# Patient Record
Sex: FEMALE | Race: WHITE | Employment: OTHER | URBAN - METROPOLITAN AREA
[De-identification: names, ages, dates, MRNs, and addresses within clinical notes are randomized per-mention and may not be internally consistent; named-entity substitution may affect disease eponyms.]

---

## 2020-08-18 ENCOUNTER — HOSPITAL ENCOUNTER (OUTPATIENT)
Dept: PHYSICAL THERAPY | Age: 65
Discharge: HOME | End: 2020-08-18
Payer: COMMERCIAL

## 2020-08-18 ENCOUNTER — HOSPITAL ENCOUNTER (OUTPATIENT)
Dept: SURGERY | Age: 65
Discharge: HOME | End: 2020-08-18
Attending: ORTHOPAEDIC SURGERY
Payer: COMMERCIAL

## 2020-08-18 ENCOUNTER — HOME HEALTH ADMISSION (OUTPATIENT)
Dept: HOME HEALTH SERVICES | Facility: HOME HEALTH | Age: 65
End: 2020-08-18
Payer: COMMERCIAL

## 2020-08-18 VITALS
HEIGHT: 71 IN | SYSTOLIC BLOOD PRESSURE: 141 MMHG | HEART RATE: 73 BPM | TEMPERATURE: 98.2 F | WEIGHT: 278 LBS | OXYGEN SATURATION: 96 % | BODY MASS INDEX: 38.92 KG/M2 | RESPIRATION RATE: 20 BRPM | DIASTOLIC BLOOD PRESSURE: 69 MMHG

## 2020-08-18 DIAGNOSIS — G47.30 SLEEP APNEA, UNSPECIFIED TYPE: Primary | ICD-10-CM

## 2020-08-18 LAB
ANION GAP SERPL CALC-SCNC: 8 MMOL/L (ref 7–16)
APTT PPP: 26.9 SEC (ref 24.3–35.4)
ATRIAL RATE: 70 BPM
BACTERIA SPEC CULT: ABNORMAL
BASOPHILS # BLD: 0.1 K/UL (ref 0–0.2)
BASOPHILS NFR BLD: 1 % (ref 0–2)
BUN SERPL-MCNC: 18 MG/DL (ref 8–23)
CALCIUM SERPL-MCNC: 8.7 MG/DL (ref 8.3–10.4)
CALCULATED P AXIS, ECG09: 18 DEGREES
CALCULATED R AXIS, ECG10: -4 DEGREES
CALCULATED T AXIS, ECG11: 13 DEGREES
CHLORIDE SERPL-SCNC: 109 MMOL/L (ref 98–107)
CO2 SERPL-SCNC: 24 MMOL/L (ref 21–32)
CREAT SERPL-MCNC: 0.7 MG/DL (ref 0.6–1)
DIAGNOSIS, 93000: NORMAL
DIFFERENTIAL METHOD BLD: NORMAL
EOSINOPHIL # BLD: 0.2 K/UL (ref 0–0.8)
EOSINOPHIL NFR BLD: 4 % (ref 0.5–7.8)
ERYTHROCYTE [DISTWIDTH] IN BLOOD BY AUTOMATED COUNT: 12.5 % (ref 11.9–14.6)
EST. AVERAGE GLUCOSE BLD GHB EST-MCNC: 166 MG/DL
GLUCOSE SERPL-MCNC: 206 MG/DL (ref 65–100)
HBA1C MFR BLD: 7.4 %
HCT VFR BLD AUTO: 39.1 % (ref 35.8–46.3)
HGB BLD-MCNC: 13.1 G/DL (ref 11.7–15.4)
IMM GRANULOCYTES # BLD AUTO: 0 K/UL (ref 0–0.5)
IMM GRANULOCYTES NFR BLD AUTO: 0 % (ref 0–5)
INR PPP: 1
LYMPHOCYTES # BLD: 2.2 K/UL (ref 0.5–4.6)
LYMPHOCYTES NFR BLD: 34 % (ref 13–44)
MCH RBC QN AUTO: 29.4 PG (ref 26.1–32.9)
MCHC RBC AUTO-ENTMCNC: 33.5 G/DL (ref 31.4–35)
MCV RBC AUTO: 87.9 FL (ref 79.6–97.8)
MONOCYTES # BLD: 0.3 K/UL (ref 0.1–1.3)
MONOCYTES NFR BLD: 5 % (ref 4–12)
NEUTS SEG # BLD: 3.6 K/UL (ref 1.7–8.2)
NEUTS SEG NFR BLD: 56 % (ref 43–78)
NRBC # BLD: 0 K/UL (ref 0–0.2)
P-R INTERVAL, ECG05: 174 MS
PLATELET # BLD AUTO: 226 K/UL (ref 150–450)
PMV BLD AUTO: 10 FL (ref 9.4–12.3)
POTASSIUM SERPL-SCNC: 4.1 MMOL/L (ref 3.5–5.1)
PROTHROMBIN TIME: 13.6 SEC (ref 12–14.7)
Q-T INTERVAL, ECG07: 402 MS
QRS DURATION, ECG06: 74 MS
QTC CALCULATION (BEZET), ECG08: 434 MS
RBC # BLD AUTO: 4.45 M/UL (ref 4.05–5.2)
SERVICE CMNT-IMP: ABNORMAL
SODIUM SERPL-SCNC: 141 MMOL/L (ref 136–145)
VENTRICULAR RATE, ECG03: 70 BPM
WBC # BLD AUTO: 6.4 K/UL (ref 4.3–11.1)

## 2020-08-18 PROCEDURE — 77030027138 HC INCENT SPIROMETER -A

## 2020-08-18 PROCEDURE — 97161 PT EVAL LOW COMPLEX 20 MIN: CPT

## 2020-08-18 PROCEDURE — 36415 COLL VENOUS BLD VENIPUNCTURE: CPT

## 2020-08-18 PROCEDURE — 85610 PROTHROMBIN TIME: CPT

## 2020-08-18 PROCEDURE — 83036 HEMOGLOBIN GLYCOSYLATED A1C: CPT

## 2020-08-18 PROCEDURE — 85025 COMPLETE CBC W/AUTO DIFF WBC: CPT

## 2020-08-18 PROCEDURE — 87641 MR-STAPH DNA AMP PROBE: CPT

## 2020-08-18 PROCEDURE — 80048 BASIC METABOLIC PNL TOTAL CA: CPT

## 2020-08-18 PROCEDURE — 85730 THROMBOPLASTIN TIME PARTIAL: CPT

## 2020-08-18 RX ORDER — IBUPROFEN 200 MG
TABLET ORAL
COMMUNITY
End: 2020-09-03

## 2020-08-18 RX ORDER — METFORMIN HYDROCHLORIDE 500 MG/1
1000 TABLET ORAL DAILY
COMMUNITY

## 2020-08-18 RX ORDER — LISINOPRIL 10 MG/1
10 TABLET ORAL DAILY
COMMUNITY

## 2020-08-18 NOTE — PERIOP NOTES
PLEASE CONTINUE TAKING ALL PRESCRIPTION MEDICATIONS UP TO THE DAY OF SURGERY UNLESS OTHERWISE DIRECTED BELOW. DISCONTINUE all vitamins, herbals and supplements 21 days prior to surgery. DISCONTINUE Non-Steriodal Anti-Inflammatory (NSAIDS) such as Advil, Ibuprofen, and Aleve 5 days prior to surgery. Home Medications to HOLD      All vitamins, supplements, and herbals stop NOW. All NSAIDs such as Advil, Aleve, Ibuprofen, Diclofenac, Naproxen, etc. Stop 5 days prior to surgery. Home Medications to take  the day of surgery   29 units of insulin (levemir)         Comments   Bring: Levemir, bottle of soap (Dynahex), CPAP, and incentive spirometer to the hospital.              Please do not bring home medications with you on the day of surgery unless otherwise directed by your nurse. If you are instructed to bring home medications, please give them to your nurse as they will be administered by the nursing staff. If you have any questions, please call [facility] [phone] or [facility] [phone]. Copy of above instructions given to patient.

## 2020-08-18 NOTE — PERIOP NOTES
Labs dated 8/18/20 unable to be routed via [facility] to patients PCP, Dr. Casey Castro; no fax number available and no answer when this RN attempted to contact office x2.

## 2020-08-18 NOTE — ADVANCED PRACTICE NURSE
Total Joint Surgery Preoperative Chart Review      Patient ID:  Marilee Villarreal  [id number]  63 y.o.  1955  Surgeon: Dr. Isabel Castillo  Date of Surgery: 9/1/2020  Procedure: Total Left Knee Arthroplasty  Primary Care Physician: Fior Adams MD None  Specialty Physician(s):      Subjective:   Marilee Villarreal is a 63 y.o. WHITE OR  female who presents for preoperative evaluation for Total Left Knee arthroplasty. This is a preoperative chart review note based on data collected by the nurse at the surgical Pre-Assessment visit. Past Medical History:   Diagnosis Date    Hypertension     SAWYER (obstructive sleep apnea)     CPAP [facility]    Osteoarthritis     Type 2 diabetes mellitus ([facility] [address]     controlled with oral medication and insulin, does not check BS daily, no problems with hypoglycemia, Hemoglobin A1c 7.4 on 8/18/20. Past Surgical History:   Procedure Laterality Date    HX LAP CHOLECYSTECTOMY      HX TONSILLECTOMY       History reviewed. No pertinent family history. Social History     Tobacco Use    Smoking status: Never Smoker    Smokeless tobacco: Never Used   Substance Use Topics    Alcohol use: Never     Frequency: Never       Prior to Admission medications    Medication Sig Start Date End Date Taking? Authorizing Provider   metFORMIN (GLUCOPHAGE) 500 mg tablet Take 1,000 mg by mouth daily. Yes Provider, Historical   lisinopriL (PRINIVIL, ZESTRIL) 10 mg tablet Take 10 mg by mouth daily. Yes Provider, Historical   insulin detemir U-100 (LEVEMIR) 100 unit/mL injection 36 Units by SubCUTAneous route daily. Patient instructed to take 29 units of insulin the morning of surgery, which is 80% of normal dose, per anesthesia protocol. Yes Provider, Historical   cholecalciferol, vitamin D3, (VITAMIN D3 PO) Take  by mouth daily. Yes Provider, Historical   ibuprofen (MOTRIN) 200 mg tablet Take  by mouth.    Yes Provider, Historical     Allergies   Allergen Reactions    Ampicillin Diarrhea Objective:     Physical Exam:   Patient Vitals for the past 24 hrs:   Temp Pulse Resp BP SpO2   08/18/20 1203 98.2 °F (36.8 °C) 73 20 141/69 96 %       ECG:    EKG Results     Procedure 720 Value Units Date/Time    EKG, 12 LEAD, INITIAL [[id number]] Collected:  08/18/20 1109    Order Status:  Completed Updated:  08/18/20 1508     Ventricular Rate 70 BPM      Atrial Rate 70 BPM      P-R Interval 174 ms      QRS Duration 74 ms      Q-T Interval 402 ms      QTC Calculation (Bezet) 434 ms      Calculated P Axis 18 degrees      Calculated R Axis -4 degrees      Calculated T Axis 13 degrees      Diagnosis --     Normal sinus rhythm  Low voltage QRS  Poor R wave progression  Abnormal ECG  When compared with ECG of 30-SEP-2005 14:00,  No significant change was found  Confirmed by Brab Harman MD (), LUDY ([id number]) on 8/18/2020 3:08:10 PM            Data Review:   Labs:   Recent Results (from the past 24 hour(s))   CBC WITH AUTOMATED DIFF    Collection Time: 08/18/20 10:58 AM   Result Value Ref Range    WBC 6.4 4.3 - 11.1 K/uL    RBC 4.45 4.05 - 5.2 M/uL    HGB 13.1 11.7 - 15.4 g/dL    HCT 39.1 35.8 - 46.3 %    MCV 87.9 79.6 - 97.8 FL    MCH 29.4 26.1 - 32.9 PG    MCHC 33.5 31.4 - 35.0 g/dL    RDW 12.5 11.9 - 14.6 %    PLATELET 957 451 - 869 K/uL    MPV 10.0 9.4 - 12.3 FL    ABSOLUTE NRBC 0.00 0.0 - 0.2 K/uL    DF AUTOMATED      NEUTROPHILS 56 43 - 78 %    LYMPHOCYTES 34 13 - 44 %    MONOCYTES 5 4.0 - 12.0 %    EOSINOPHILS 4 0.5 - 7.8 %    BASOPHILS 1 0.0 - 2.0 %    IMMATURE GRANULOCYTES 0 0.0 - 5.0 %    ABS. NEUTROPHILS 3.6 1.7 - 8.2 K/UL    ABS. LYMPHOCYTES 2.2 0.5 - 4.6 K/UL    ABS. MONOCYTES 0.3 0.1 - 1.3 K/UL    ABS. EOSINOPHILS 0.2 0.0 - 0.8 K/UL    ABS. BASOPHILS 0.1 0.0 - 0.2 K/UL    ABS. IMM.  GRANS. 0.0 0.0 - 0.5 K/UL   HEMOGLOBIN A1C WITH EAG    Collection Time: 08/18/20 10:58 AM   Result Value Ref Range    Hemoglobin A1c 7.4 %    Est. average glucose 509 mg/dL   METABOLIC PANEL, BASIC    Collection Time: 08/18/20 10:58 AM   Result Value Ref Range    Sodium 141 136 - 145 mmol/L    Potassium 4.1 3.5 - 5.1 mmol/L    Chloride 109 (H) 98 - 107 mmol/L    CO2 24 21 - 32 mmol/L    Anion gap 8 7 - 16 mmol/L    Glucose 206 (H) 65 - 100 mg/dL    BUN 18 8 - 23 MG/DL    Creatinine 0.70 0.6 - 1.0 MG/DL    GFR est AA >60 >60 ml/min/1.73m2    GFR est non-AA >60 >60 ml/min/1.73m2    Calcium 8.7 8.3 - 10.4 MG/DL   PROTHROMBIN TIME + INR    Collection Time: 08/18/20 10:58 AM   Result Value Ref Range    Prothrombin time 13.6 12.0 - 14.7 sec    INR 1.0     PTT    Collection Time: 08/18/20 10:58 AM   Result Value Ref Range    aPTT 26.9 24.3 - 35.4 SEC   EKG, 12 LEAD, INITIAL    Collection Time: 08/18/20 11:09 AM   Result Value Ref Range    Ventricular Rate 70 BPM    Atrial Rate 70 BPM    P-R Interval 174 ms    QRS Duration 74 ms    Q-T Interval 402 ms    QTC Calculation (Bezet) 434 ms    Calculated P Axis 18 degrees    Calculated R Axis -4 degrees    Calculated T Axis 13 degrees    Diagnosis       Normal sinus rhythm  Low voltage QRS  Poor R wave progression  Abnormal ECG  When compared with ECG of 30-SEP-2005 14:00,  No significant change was found  Confirmed by Lubna Dill MD (), LUDY ([id number]) on 8/18/2020 3:08:10 PM     MSSA/MRSA SC BY PCR, NASAL SWAB    Collection Time: 08/18/20 11:40 AM    Specimen: Nasal swab   Result Value Ref Range    Special Requests: NASAL O2      Culture result: (A)       MRSA target DNA not detected, SA target DNA detected. A MRSA negative, SA positive test result does not preclude MRSA nasal colonization. Problem List:  )There is no problem list on file for this patient. Total Joint Surgery Pre-Assessment Recommendations:           Patient is to wear home CPAP during hospitalization.    Will order HST patient needs new CPAP  Signed By: JASON Beavers    August 18, 2020

## 2020-08-18 NOTE — PROGRESS NOTES
Alexandrea Ward  : (67 y.o.) Joint Catrina Finger at [address] [address].  [phone]       Physical Therapy Prehab Plan of Treatment and Evaluation Summary:2020    ICD-10: Treatment Diagnosis:   · Pain in Left Knee (M25.562)  · Stiffness of Left Knee, Not elsewhere classified (B78.322)  Precautions/Allergies:   Patient has no allergy information on record. MEDICAL/REFERRING DIAGNOSIS:  Bilateral primary osteoarthritis of knee [M17.0]  REFERRING PHYSICIAN: Brian Lanza,*  DATE OF SURGERY: 20    Assessment:   Comments:  She is here by herself. She plans on having L TKA and will go home at [place] with her spouse and he will offer assistance. PROBLEM LIST (Impacting functional limitations):  Ms. Riva Favre presents with the following left lower extremity(s) problems:  1. Strength  2. Range of Motion  3. Home Exercise Program  4. Pain   INTERVENTIONS PLANNED:  1. Home Exercise Program  2. Educational Discussion      TREATMENT PLAN: Effective Dates: 2020 TO 2020. Frequency/Duration: Patient to continue to perform home exercise program at least twice per day up until her surgery. GOALS: (Goals have been discussed and agreed upon with patient.)  Discharge Goals: Time Frame: 1 Day  1. Patient will demonstrate independence with a home exercise program designed to increase strength, range of motion and pain control to minimize functional deficits and optimize patient for total joint replacement. Rehabilitation Potential For Stated Goals: Good  Regarding Garrett Paris's therapy, I certify that the treatment plan above will be carried out by a therapist or under their direction.   Thank you for this referral,  Jennifer Low, PT               HISTORY:   Present Symptoms:  Pain Intensity 1: 7  Pain Location 1: Knee  Pain Orientation 1: Anterior, Lateral, Medial, Left   History of Present Injury/Illness (Reason for Referral):  Medical/Referring Diagnosis: Bilateral primary osteoarthritis of knee [M17.0]   Past Medical History/Comorbidities:   Ms. French Crowell  has a past medical history of Hypertension, SAWYER (obstructive sleep apnea), and Type 2 diabetes mellitus ([facility] [address] Ms. French Crowell  has a past surgical history that includes hx tonsillectomy.   Social History/Living Environment:   Home Environment: Private residence  # Steps to Enter: 5  Hand Rails : Right  One/Two Story Residence: Two story, live on 1st floor  # of Interior Steps: [address]: Left  Support Systems: Parent  Patient Expects to be Discharged to[de-identified] Private residence  Current DME Used/Available at Home: None  Work/Activity:  retired  Dominant Side:  RIGHT  Current Medications:  See Pre-assessment nursing note   Number of Personal Factors/Comorbidities that affect the Plan of Care: 1-2: MODERATE COMPLEXITY   EXAMINATION:   ADLs (Current Functional Status):   Ambulation:  [x] Independent  [] Walk Indoors Only  [] Walk Outdoors  [] Use Assistive Device  [] Use Wheelchair Only Dressing:  [x] [address] from Someone for:  [] Sock/Shoes  [] Pants  [] Everything   Bathing/Showering:   [x] Independent  [] Requires Assistance from Someone  [] [address] Only Household Activities:  [x] Routine house and yard work  [] Light Housework Only  [] None   Observation/Orthostatic Postural Assessment:    Genu varus left, Genu varus right  ROM/Flexibility:   AROM: Within functional limits(R LE)                           Strength:   Strength: Generally decreased, functional(R LE 4/5)                  Functional Mobility:    Sensation: Intact(R LE)    Stand to Sit: Independent  Sit to Stand: Independent  Stand Pivot Transfers: Independent  Distance (ft): 300 Feet (ft)  Ambulation - Level of Assistance: Independent  Speed/Lizzie: Pace decreased (<100 feet/min)  Gait Abnormalities: Antalgic          Balance:    Sitting: Intact  Standing: Intact   Body Structures Involved:  1. Bones  2. Joints  3. Muscles Body Functions Affected:  1. Neuromusculoskeletal  2. Movement Related Activities and Participation Affected:  1. General Tasks and Demands  2. Mobility   Number of elements that affect the Plan of Care: 4+: HIGH COMPLEXITY   CLINICAL PRESENTATION:   Presentation: Stable and uncomplicated: LOW COMPLEXITY   CLINICAL DECISION MAKING:   Outcome Measure: Tool Used: Lower Extremity Functional Scale (LEFS)  Score:  Initial: 45/80 Most Recent: X/80 (Date: -- )   Interpretation of Score: 20 questions each scored on a 5 point scale with 0 representing \"extreme difficulty or unable to perform\" and 4 representing \"no difficulty\". The lower the score, the greater the functional disability. 80/80 represents no disability. Minimal detectable change is 9 points. Medical Necessity:   · Ms. Vane Gloria is expected to optimize her lower extremity strength and ROM in preparation for joint replacement surgery. Reason for Services/Other Comments:  · Achieve baseline assesment of musculoskeletal system, functional mobility and home environment. , educate in PT HEP in preparation for surgery, educate in hospital plan of care. Use of outcome tool(s) and clinical judgement create a POC that gives a: Clear prediction of patient's progress: LOW COMPLEXITY   TREATMENT:   Treatment/Session Assessment:  Patient was instructed in PT- HEP to increase strength and ROM in LEs. Answered all questions. · Post session pain:  2  · Compliance with Program/Exercises: compliant most of the time.   Total Treatment Duration:  PT Patient Time In/Time Out  Time In: 1045  Time Out: [address] [facility]

## 2020-08-18 NOTE — PROGRESS NOTES
SW spoke with pt in prehab. Pt is scheduled for a L TKA on 9-1 by Dr. Venice Feliz. Pt plans to d/c home with family support ( going to her mom's [address] [place] [address] ) & HH. Pt offered choices of Hh & prefers [facility]. Pt will need DME ( [facility] and [facility] ). Referral made to [facility].

## 2020-08-18 NOTE — PERIOP NOTES
Recent Results (from the past 12 hour(s))   CBC WITH AUTOMATED DIFF    Collection Time: 08/18/20 10:58 AM   Result Value Ref Range    WBC 6.4 4.3 - 11.1 K/uL    RBC 4.45 4.05 - 5.2 M/uL    HGB 13.1 11.7 - 15.4 g/dL    HCT 39.1 35.8 - 46.3 %    MCV 87.9 79.6 - 97.8 FL    MCH 29.4 26.1 - 32.9 PG    MCHC 33.5 31.4 - 35.0 g/dL    RDW 12.5 11.9 - 14.6 %    PLATELET 145 131 - 075 K/uL    MPV 10.0 9.4 - 12.3 FL    ABSOLUTE NRBC 0.00 0.0 - 0.2 K/uL    DF AUTOMATED      NEUTROPHILS 56 43 - 78 %    LYMPHOCYTES 34 13 - 44 %    MONOCYTES 5 4.0 - 12.0 %    EOSINOPHILS 4 0.5 - 7.8 %    BASOPHILS 1 0.0 - 2.0 %    IMMATURE GRANULOCYTES 0 0.0 - 5.0 %    ABS. NEUTROPHILS 3.6 1.7 - 8.2 K/UL    ABS. LYMPHOCYTES 2.2 0.5 - 4.6 K/UL    ABS. MONOCYTES 0.3 0.1 - 1.3 K/UL    ABS. EOSINOPHILS 0.2 0.0 - 0.8 K/UL    ABS. BASOPHILS 0.1 0.0 - 0.2 K/UL    ABS. IMM.  GRANS. 0.0 0.0 - 0.5 K/UL   HEMOGLOBIN A1C WITH EAG    Collection Time: 08/18/20 10:58 AM   Result Value Ref Range    Hemoglobin A1c 7.4 %    Est. average glucose 448 mg/dL   METABOLIC PANEL, BASIC    Collection Time: 08/18/20 10:58 AM   Result Value Ref Range    Sodium 141 136 - 145 mmol/L    Potassium 4.1 3.5 - 5.1 mmol/L    Chloride 109 (H) 98 - 107 mmol/L    CO2 24 21 - 32 mmol/L    Anion gap 8 7 - 16 mmol/L    Glucose 206 (H) 65 - 100 mg/dL    BUN 18 8 - 23 MG/DL    Creatinine 0.70 0.6 - 1.0 MG/DL    GFR est AA >60 >60 ml/min/1.73m2    GFR est non-AA >60 >60 ml/min/1.73m2    Calcium 8.7 8.3 - 10.4 MG/DL   PROTHROMBIN TIME + INR    Collection Time: 08/18/20 10:58 AM   Result Value Ref Range    Prothrombin time 13.6 12.0 - 14.7 sec    INR 1.0     PTT    Collection Time: 08/18/20 10:58 AM   Result Value Ref Range    aPTT 26.9 24.3 - 35.4 SEC   EKG, 12 LEAD, INITIAL    Collection Time: 08/18/20 11:09 AM   Result Value Ref Range    Ventricular Rate 70 BPM    Atrial Rate 70 BPM    P-R Interval 174 ms    QRS Duration 74 ms    Q-T Interval 402 ms    QTC Calculation (Bezet) 434 ms Calculated P Axis 18 degrees    Calculated R Axis -4 degrees    Calculated T Axis 13 degrees    Diagnosis       Normal sinus rhythm  Low voltage QRS  Cannot rule out Anterior infarct (cited on or before 30-SEP-2005)  Abnormal ECG  When compared with ECG of 30-SEP-2005 14:00,  No significant change was found

## 2020-08-18 NOTE — PROGRESS NOTES
08/18/20 1030   Oxygen Therapy   O2 Sat (%) 95 %   Pulse via Oximetry 91 beats per minute   O2 Device Room air   Pre-Treatment   Breath Sounds Bilateral Diminished   Incentive Spirometry Treatment   Actual Volume (ml) 1500 ml   Pt's symptoms include:    Snoring  Tiredness- excessive daytime sleepiness  Observed apnea  HTN  Neck size      44.5        cm  Modified Conway stage 4  SACS Score 42  STOP BANG 7    Height  5  11  '    \"   Weight  278   lbs  BMI 38.77  Initial respiratory Assessment completed with pt. Pt was interviewed and evaluated in Joint camp prior to surgery. Patient ID:  Chela Wilson  [id number]  63 y.o.  1955  Surgeon: Dr. Shraddha Rodriguez  Date of Surgery: 9/1/2020  Procedure: Total Left Knee Arthroplasty  Primary Care Physician: Vicky Woodward MD None  Specialists:     Pt. IS SOMEWHAT PRACTICING SOCIAL DISTANCING AND WEARING A MASK WHEN IN PUBLIC. Pt instructed in the use of Incentive Spirometry. Pt instructed to bring Incentive Spirometer back on date of surgery & to start using Is upon return to pt room. Written instructions given to patient. Pt taught proper cough technique    History of smoking:   DENIES                 Quit date:         Secondhand smoke:DENIES    Past procedures with Oxygen desaturation or delayed awakening:DENIES    Past Medical History:   Diagnosis Date    Hypertension     SAWYER (obstructive sleep apnea)     CPAP QHS    Osteoarthritis     Type 2 diabetes mellitus ([facility] [address]     controlled with oral medication and insulin, does not check BS daily, no problems with hypoglycemia, Hemoglobin A1c 7.4 on 8/18/20.           Respiratory history:DENIES SOB                                                                  Respiratory meds:  DENIES    FAMILY PRESENT:             NO                                                   PAST SLEEP STUDY:        YES                  IN [facility] OF SAWYER:                        YES                    SAWYER assessment: SLEEPS ON SIDE                                                  PHYSICAL EXAM   Body mass index is 38.77 kg/m². Visit Vitals  /69 (BP 1 Location: Left arm, BP Patient Position: At rest;Sitting)   Pulse 73   Temp 98.2 °F (36.8 °C)   Resp 20   Ht 5' 11\" (1.803 m)   Wt 126.1 kg (278 lb)   SpO2 96%   BMI 38.77 kg/m²     Neck circumference:   44.5   cm    Loud snoring:                                                 YES            Witnessed apnea or wakening gasping or choking:               APNEA  Awakens with headaches:                                               YES  Morning or daytime tiredness/ sleepiness:                           TIRED  Dry mouth or sore throat in morning:            YES                                               Conway stage:  4                                   SACS score:42  Stop Bang   STOP-BANG  Does the patient snore loudly (louder than talking or loud enough to be heard through closed doors)?: Yes  Does the patient often feel tired, fatigued, or sleepy during the daytime, even after a \"good\" night's sleep?: Yes  Has anyone ever observed the patient stop breathing during their sleep? : Yes  Does the patient have or are they being treated for high blood pressure?: Yes  Is the patient's BMI greater than 35?: Yes  Is your neck circumference greater than 17 inches (Male) or 16 inches (Female)?: Yes  Is the patient older than 48?: Yes  Is the patient male?: No  SAWYER Score: 7  Has the patient been referred to Sleep Medicine?: Yes  Has the patient previously been diagnosed with Obstructive Sleep Apnea?: Yes  Treated or Untreated?: Treated                            Pt. Is positive for SAWYER and uses HOME CPAP and will bring to Hospital day of surgery. PT HAS NOT HAD ANEW CPAP IN YEARS AND WANTED REFERRAL TO SLEEP CENTER                                           Referrals:  [facility]  Pt.  Phone Number:  [phone]    Refer patient for sleep study based on above assessment. Initial respiratory Assessment completed with pt. Pt was interviewed and evaluated in Joint camp prior to surgery. Patient ID:  Alexandrea Ward  [id number]  63 y.o.  1955  Surgeon: Dr. Ihsan Galaviz  Date of Surgery: 9/1/2020  Procedure: Total Left Knee Arthroplasty  Primary Care Physician: Dania Pepper MD None  Specialists:     Pt. IS SOMEWHAT PRACTICING SOCIAL DISTANCING AND WEARING A MASK WHEN IN PUBLIC. Pt instructed in the use of Incentive Spirometry. Pt instructed to bring Incentive Spirometer back on date of surgery & to start using Is upon return to pt room. Written instructions given to patient. Pt taught proper cough technique    History of smoking:   DENIES                 Quit date:         Secondhand smoke:DENIES    Past procedures with Oxygen desaturation or delayed awakening:DENIES    Past Medical History:   Diagnosis Date    Hypertension     SAWYER (obstructive sleep apnea)     CPAP QHS    Osteoarthritis     Type 2 diabetes mellitus ([facility] [address]     controlled with oral medication and insulin, does not check BS daily, no problems with hypoglycemia, Hemoglobin A1c 7.4 on 8/18/20. Respiratory history:DENIES SOB                                                                   Respiratory meds:  DENIES    FAMILY PRESENT:             NO                                                   PAST SLEEP STUDY:        YES                     HX OF SAWYER:                        YES                    SAWYER assessment:                                               SLEEPS ON SIDE                                                   PHYSICAL EXAM   Body mass index is 38.77 kg/m².    Visit Vitals  /69 (BP 1 Location: Left arm, BP Patient Position: At rest;Sitting)   Pulse 73   Temp 98.2 °F (36.8 °C)   Resp 20   Ht 5' 11\" (1.803 m)   Wt 126.1 kg (278 lb)   SpO2 96%   BMI 38.77 kg/m²     Neck circumference: 44.5     cm    Loud snoring: YES             Witnessed apnea or wakening gasping or choking:            APNEA  Awakens with headaches:                                               YES  Morning or daytime tiredness/ sleepiness:                             TIRED  Dry mouth or sore throat in morning:            YES                                              Conway stage:  4                                   SACS score:42  Stop Bang   STOP-BANG  Does the patient snore loudly (louder than talking or loud enough to be heard through closed doors)?: Yes  Does the patient often feel tired, fatigued, or sleepy during the daytime, even after a \"good\" night's sleep?: Yes  Has anyone ever observed the patient stop breathing during their sleep? : Yes  Does the patient have or are they being treated for high blood pressure?: Yes  Is the patient's BMI greater than 35?: Yes  Is your neck circumference greater than 17 inches (Male) or 16 inches (Female)?: Yes  Is the patient older than 48?: Yes  Is the patient male?: No  SAWYER Score: 7  Has the patient been referred to Sleep Medicine?: Yes  Has the patient previously been diagnosed with Obstructive Sleep Apnea?: Yes  Treated or Untreated?: Treated                                                                  Pt. Is positive for SAWYER and uses HOME CPAP and will bring to Hospital day of surgery. PT NEEDS HAST TO ESTABLISH RELATIONSHIP WITH SLEEP CENTER & OBTAIN NEW CPAP    Referrals:  [facility]  Pt.  Phone Number:  [phone]

## 2020-08-18 NOTE — PERIOP NOTES
Patient verified name and . Order for consent found in EHR and matches case posting; patient verified. Type 3 surgery, PAT JOINT assessment complete. Labs per surgeon: CBC,BMP, PT/PTT, Hemoglobin A1c; results within anesthesia limits. T&S DOS and POC glucose DOS; orders signed and held in EHR. Labs per anesthesia protocol: no additional  EKG: Completed today; results to be reviewed by anesthesia-charge nurse to f/u. No previous EKG available. A negative Covid swab result is required to proceed with surgery; The testing center is located at the [address], [place]. An appointment is required therefore the patient will be contacted by the Covid swab team. The testing clinic is closed from  for lunch and on weekends. For questions or concerns the patient should call [phone]. Appointment date/time 20 at 56 found in EHR and provided to patient. MRSA/MSSA swab collected; pharmacy to review and dose antibiotic as appropriate. Hospital approved surgical skin cleanser and instructions to return bottle on DOS given per hospital policy. Patient provided with handouts including Guide to Surgery, Pain Management, Hand Hygiene, Blood Transfusion Education, and [facility] Brochure. Patient answered medical/surgical history questions at their best of ability. All prior to admission medications documented in [facility]. Original medication prescription bottle NOT visualized during patient appointment. Patient instructed to hold all vitamins, supplements, herbals 3 weeks prior to surgery and NSAIDS 5 days prior to surgery. Patient teach back successful and patient demonstrates knowledge of instruction.

## 2020-08-28 NOTE — H&P
[id number] [facility]  History and Physical Exam    Patient ID:  Eunice Keller  [id number]    63 y.o.  1955    Today: August 28, 2020    Vitals Signs: Reviewed as noted in medical record. Allergies: Allergies   Allergen Reactions    Ampicillin Diarrhea       CC: Left knee pain    HPI:  Pt complains of left knee pain and difficulty ambulating. Relevant PMH:   Past Medical History:   Diagnosis Date    Hypertension     SAYWER (obstructive sleep apnea)     CPAP QHS    Osteoarthritis     Type 2 diabetes mellitus ([address]     controlled with oral medication and insulin, does not check BS daily, no problems with hypoglycemia, Hemoglobin A1c 7.4 on 8/18/20. Objective:                    HEENT: NC/AT                   Lungs:  clear                   Heart:   rrr                   Abdomen: soft                   Extremities:  Pain with rom of the left knee joint    Radiographs: reveal osteoarthritis with loss of joint space and bone spurs. Assessment: Bilateral primary osteoarthritis of knee [M17.0]    Plan:  Proceed with scheduled Procedure(s) (LRB):  LEFT KNEE ARTHROPLASTY TOTAL ROBOTIC ASSISTED/ FERNANDA/ DAO (Left) . The patient has failed conservative treatment including NSAIDS, and injections. Due to the amount of pain the patient is experiencing we will proceed with scheduled procedure. It is also felt that the patient is high risk for postoperative complication due to history of multiple chronic medical problems.   The patient may potentially spend 2 nights in the hospital.      Signed By: ANA Hansen  August 28, 2020

## 2020-08-31 RX ORDER — CEFAZOLIN SODIUM/WATER 2 G/20 ML
2 SYRINGE (ML) INTRAVENOUS ONCE
Status: CANCELLED | OUTPATIENT
Start: 2020-09-01 | End: 2020-09-01

## 2020-09-01 ENCOUNTER — ANESTHESIA (OUTPATIENT)
Dept: SURGERY | Age: 65
DRG: 470 | End: 2020-09-01
Payer: COMMERCIAL

## 2020-09-01 ENCOUNTER — ANESTHESIA EVENT (OUTPATIENT)
Dept: SURGERY | Age: 65
DRG: 470 | End: 2020-09-01
Payer: COMMERCIAL

## 2020-09-01 ENCOUNTER — HOSPITAL ENCOUNTER (INPATIENT)
Age: 65
LOS: 2 days | Discharge: HOME HEALTH | DRG: 470 | End: 2020-09-03
Attending: ORTHOPAEDIC SURGERY | Admitting: ORTHOPAEDIC SURGERY
Payer: COMMERCIAL

## 2020-09-01 DIAGNOSIS — Z96.652 STATUS POST TOTAL LEFT KNEE REPLACEMENT: Primary | ICD-10-CM

## 2020-09-01 PROBLEM — M17.12 ARTHRITIS OF LEFT KNEE: Status: ACTIVE | Noted: 2020-09-01

## 2020-09-01 LAB
GLUCOSE BLD STRIP.AUTO-MCNC: 140 MG/DL (ref 65–100)
GLUCOSE BLD STRIP.AUTO-MCNC: 149 MG/DL (ref 65–100)
HGB BLD-MCNC: 11.2 G/DL (ref 11.7–15.4)

## 2020-09-01 PROCEDURE — 97165 OT EVAL LOW COMPLEX 30 MIN: CPT

## 2020-09-01 PROCEDURE — C1713 ANCHOR/SCREW BN/BN,TIS/BN: HCPCS | Performed by: ORTHOPAEDIC SURGERY

## 2020-09-01 PROCEDURE — 74011250637 HC RX REV CODE- 250/637: Performed by: ANESTHESIOLOGY

## 2020-09-01 PROCEDURE — 74011250636 HC RX REV CODE- 250/636: Performed by: ORTHOPAEDIC SURGERY

## 2020-09-01 PROCEDURE — 77030040922 HC BLNKT HYPOTHRM STRY -A: Performed by: ANESTHESIOLOGY

## 2020-09-01 PROCEDURE — 76942 ECHO GUIDE FOR BIOPSY: CPT | Performed by: ORTHOPAEDIC SURGERY

## 2020-09-01 PROCEDURE — 77030003602 HC NDL NRV BLK BBMI -B: Performed by: ANESTHESIOLOGY

## 2020-09-01 PROCEDURE — 74011000258 HC RX REV CODE- 258: Performed by: ORTHOPAEDIC SURGERY

## 2020-09-01 PROCEDURE — 74011250636 HC RX REV CODE- 250/636: Performed by: ANESTHESIOLOGY

## 2020-09-01 PROCEDURE — 77030011208: Performed by: ORTHOPAEDIC SURGERY

## 2020-09-01 PROCEDURE — 74011000250 HC RX REV CODE- 250: Performed by: ORTHOPAEDIC SURGERY

## 2020-09-01 PROCEDURE — 74011000250 HC RX REV CODE- 250: Performed by: REGISTERED NURSE

## 2020-09-01 PROCEDURE — 77030019557 HC ELECTRD VES SEAL MEDT -F: Performed by: ORTHOPAEDIC SURGERY

## 2020-09-01 PROCEDURE — 77030029820: Performed by: ORTHOPAEDIC SURGERY

## 2020-09-01 PROCEDURE — 94760 N-INVAS EAR/PLS OXIMETRY 1: CPT

## 2020-09-01 PROCEDURE — 74011250637 HC RX REV CODE- 250/637: Performed by: PHYSICIAN ASSISTANT

## 2020-09-01 PROCEDURE — 77030006835 HC BLD SAW SAG STRY -B: Performed by: ORTHOPAEDIC SURGERY

## 2020-09-01 PROCEDURE — 77030002966 HC SUT PDS J&J -A: Performed by: ORTHOPAEDIC SURGERY

## 2020-09-01 PROCEDURE — 74011000250 HC RX REV CODE- 250: Performed by: ANESTHESIOLOGY

## 2020-09-01 PROCEDURE — C1776 JOINT DEVICE (IMPLANTABLE): HCPCS | Performed by: ORTHOPAEDIC SURGERY

## 2020-09-01 PROCEDURE — 76010010054 HC POST OP PAIN BLOCK: Performed by: ORTHOPAEDIC SURGERY

## 2020-09-01 PROCEDURE — 8E0Y4CZ ROBOTIC ASSISTED PROCEDURE OF LOWER EXTREMITY, PERCUTANEOUS ENDOSCOPIC APPROACH: ICD-10-PCS | Performed by: ORTHOPAEDIC SURGERY

## 2020-09-01 PROCEDURE — 77030003665 HC NDL SPN BBMI -A: Performed by: ANESTHESIOLOGY

## 2020-09-01 PROCEDURE — 76010000171 HC OR TIME 2 TO 2.5 HR INTENSV-TIER 1: Performed by: ORTHOPAEDIC SURGERY

## 2020-09-01 PROCEDURE — 97110 THERAPEUTIC EXERCISES: CPT

## 2020-09-01 PROCEDURE — 74011250636 HC RX REV CODE- 250/636: Performed by: REGISTERED NURSE

## 2020-09-01 PROCEDURE — 77030029828 HC FEM TIB CKPNT KT DISP STRY -B: Performed by: ORTHOPAEDIC SURGERY

## 2020-09-01 PROCEDURE — 77030013708 HC HNDPC SUC IRR PULS STRY –B: Performed by: ORTHOPAEDIC SURGERY

## 2020-09-01 PROCEDURE — 77030007880 HC KT SPN EPDRL BBMI -B: Performed by: ANESTHESIOLOGY

## 2020-09-01 PROCEDURE — 77030008462 HC STPLR SKN PROX J&J -A: Performed by: ORTHOPAEDIC SURGERY

## 2020-09-01 PROCEDURE — 74011000250 HC RX REV CODE- 250: Performed by: PHYSICIAN ASSISTANT

## 2020-09-01 PROCEDURE — 36415 COLL VENOUS BLD VENIPUNCTURE: CPT

## 2020-09-01 PROCEDURE — 85018 HEMOGLOBIN: CPT

## 2020-09-01 PROCEDURE — 82962 GLUCOSE BLOOD TEST: CPT

## 2020-09-01 PROCEDURE — 74011250636 HC RX REV CODE- 250/636: Performed by: PHYSICIAN ASSISTANT

## 2020-09-01 PROCEDURE — 0SRD0JA REPLACEMENT OF LEFT KNEE JOINT WITH SYNTHETIC SUBSTITUTE, UNCEMENTED, OPEN APPROACH: ICD-10-PCS | Performed by: ORTHOPAEDIC SURGERY

## 2020-09-01 PROCEDURE — 94762 N-INVAS EAR/PLS OXIMTRY CONT: CPT

## 2020-09-01 PROCEDURE — 77030038149 HC BLD SAW SAG STRY -D: Performed by: ORTHOPAEDIC SURGERY

## 2020-09-01 PROCEDURE — 77030012935 HC DRSG AQUACEL BMS -B: Performed by: ORTHOPAEDIC SURGERY

## 2020-09-01 PROCEDURE — 76060000035 HC ANESTHESIA 2 TO 2.5 HR: Performed by: ORTHOPAEDIC SURGERY

## 2020-09-01 PROCEDURE — 65270000029 HC RM PRIVATE

## 2020-09-01 PROCEDURE — 76210000063 HC OR PH I REC FIRST 0.5 HR: Performed by: ORTHOPAEDIC SURGERY

## 2020-09-01 PROCEDURE — 97535 SELF CARE MNGMENT TRAINING: CPT

## 2020-09-01 PROCEDURE — 77030031139 HC SUT VCRL2 J&J -A: Performed by: ORTHOPAEDIC SURGERY

## 2020-09-01 PROCEDURE — 77030002933 HC SUT MCRYL J&J -A: Performed by: ORTHOPAEDIC SURGERY

## 2020-09-01 PROCEDURE — 97161 PT EVAL LOW COMPLEX 20 MIN: CPT

## 2020-09-01 DEVICE — CRUCIATE RETAINING FEMORAL
Type: IMPLANTABLE DEVICE | Site: KNEE | Status: FUNCTIONAL
Brand: TRIATHLON

## 2020-09-01 DEVICE — KNEE K2 TOT HEMI ADV CMTLS -- IMPL CAPPED K2: Type: IMPLANTABLE DEVICE | Status: FUNCTIONAL

## 2020-09-01 DEVICE — TIBIAL BEARING INSERT
Type: IMPLANTABLE DEVICE | Site: KNEE | Status: FUNCTIONAL
Brand: TRIATHLON

## 2020-09-01 DEVICE — PATELLA
Type: IMPLANTABLE DEVICE | Site: KNEE | Status: FUNCTIONAL
Brand: TRIATHLON

## 2020-09-01 DEVICE — TIBIAL COMPONENT
Type: IMPLANTABLE DEVICE | Site: KNEE | Status: FUNCTIONAL
Brand: TRIATHLON

## 2020-09-01 RX ORDER — AMOXICILLIN 250 MG
2 CAPSULE ORAL DAILY
Status: DISCONTINUED | OUTPATIENT
Start: 2020-09-02 | End: 2020-09-03 | Stop reason: HOSPADM

## 2020-09-01 RX ORDER — MIDAZOLAM HYDROCHLORIDE 1 MG/ML
2 INJECTION, SOLUTION INTRAMUSCULAR; INTRAVENOUS ONCE
Status: COMPLETED | OUTPATIENT
Start: 2020-09-01 | End: 2020-09-01

## 2020-09-01 RX ORDER — PROPOFOL 10 MG/ML
INJECTION, EMULSION INTRAVENOUS
Status: DISCONTINUED | OUTPATIENT
Start: 2020-09-01 | End: 2020-09-01 | Stop reason: HOSPADM

## 2020-09-01 RX ORDER — SODIUM CHLORIDE 9 MG/ML
100 INJECTION, SOLUTION INTRAVENOUS CONTINUOUS
Status: DISPENSED | OUTPATIENT
Start: 2020-09-01 | End: 2020-09-02

## 2020-09-01 RX ORDER — TRANEXAMIC ACID 100 MG/ML
INJECTION, SOLUTION INTRAVENOUS AS NEEDED
Status: DISCONTINUED | OUTPATIENT
Start: 2020-09-01 | End: 2020-09-01 | Stop reason: HOSPADM

## 2020-09-01 RX ORDER — HYDROMORPHONE HYDROCHLORIDE 2 MG/ML
0.5 INJECTION, SOLUTION INTRAMUSCULAR; INTRAVENOUS; SUBCUTANEOUS
Status: DISCONTINUED | OUTPATIENT
Start: 2020-09-01 | End: 2020-09-01 | Stop reason: HOSPADM

## 2020-09-01 RX ORDER — OXYCODONE HYDROCHLORIDE 5 MG/1
10 TABLET ORAL
Status: DISCONTINUED | OUTPATIENT
Start: 2020-09-01 | End: 2020-09-03 | Stop reason: HOSPADM

## 2020-09-01 RX ORDER — ROPIVACAINE HYDROCHLORIDE 2 MG/ML
INJECTION, SOLUTION EPIDURAL; INFILTRATION; PERINEURAL AS NEEDED
Status: DISCONTINUED | OUTPATIENT
Start: 2020-09-01 | End: 2020-09-01 | Stop reason: HOSPADM

## 2020-09-01 RX ORDER — METFORMIN HYDROCHLORIDE 500 MG/1
1000 TABLET ORAL DAILY
Status: DISCONTINUED | OUTPATIENT
Start: 2020-09-02 | End: 2020-09-03 | Stop reason: HOSPADM

## 2020-09-01 RX ORDER — NALOXONE HYDROCHLORIDE 0.4 MG/ML
0.1 INJECTION, SOLUTION INTRAMUSCULAR; INTRAVENOUS; SUBCUTANEOUS AS NEEDED
Status: DISCONTINUED | OUTPATIENT
Start: 2020-09-01 | End: 2020-09-01 | Stop reason: HOSPADM

## 2020-09-01 RX ORDER — CELECOXIB 200 MG/1
200 CAPSULE ORAL ONCE
Status: COMPLETED | OUTPATIENT
Start: 2020-09-01 | End: 2020-09-01

## 2020-09-01 RX ORDER — SODIUM CHLORIDE, SODIUM LACTATE, POTASSIUM CHLORIDE, CALCIUM CHLORIDE 600; 310; 30; 20 MG/100ML; MG/100ML; MG/100ML; MG/100ML
125 INJECTION, SOLUTION INTRAVENOUS CONTINUOUS
Status: DISCONTINUED | OUTPATIENT
Start: 2020-09-01 | End: 2020-09-01 | Stop reason: HOSPADM

## 2020-09-01 RX ORDER — ASPIRIN 81 MG/1
81 TABLET ORAL EVERY 12 HOURS
Status: DISCONTINUED | OUTPATIENT
Start: 2020-09-01 | End: 2020-09-03 | Stop reason: HOSPADM

## 2020-09-01 RX ORDER — KETOROLAC TROMETHAMINE 30 MG/ML
INJECTION, SOLUTION INTRAMUSCULAR; INTRAVENOUS AS NEEDED
Status: DISCONTINUED | OUTPATIENT
Start: 2020-09-01 | End: 2020-09-01 | Stop reason: HOSPADM

## 2020-09-01 RX ORDER — OXYCODONE HYDROCHLORIDE 5 MG/1
10 TABLET ORAL
Status: DISCONTINUED | OUTPATIENT
Start: 2020-09-01 | End: 2020-09-01 | Stop reason: HOSPADM

## 2020-09-01 RX ORDER — PROMETHAZINE HYDROCHLORIDE 25 MG/ML
INJECTION, SOLUTION INTRAMUSCULAR; INTRAVENOUS
Status: ACTIVE
Start: 2020-09-01 | End: 2020-09-02

## 2020-09-01 RX ORDER — ACETAMINOPHEN 325 MG/1
975 TABLET ORAL ONCE
Status: DISCONTINUED | OUTPATIENT
Start: 2020-09-01 | End: 2020-09-01 | Stop reason: SDUPTHER

## 2020-09-01 RX ORDER — VANCOMYCIN HYDROCHLORIDE 1 G/20ML
INJECTION, POWDER, LYOPHILIZED, FOR SOLUTION INTRAVENOUS AS NEEDED
Status: DISCONTINUED | OUTPATIENT
Start: 2020-09-01 | End: 2020-09-01 | Stop reason: HOSPADM

## 2020-09-01 RX ORDER — DIPHENHYDRAMINE HCL 25 MG
25 CAPSULE ORAL
Status: DISCONTINUED | OUTPATIENT
Start: 2020-09-01 | End: 2020-09-03 | Stop reason: HOSPADM

## 2020-09-01 RX ORDER — LIDOCAINE HYDROCHLORIDE 10 MG/ML
0.1 INJECTION INFILTRATION; PERINEURAL AS NEEDED
Status: DISCONTINUED | OUTPATIENT
Start: 2020-09-01 | End: 2020-09-01 | Stop reason: HOSPADM

## 2020-09-01 RX ORDER — BUPIVACAINE HYDROCHLORIDE 7.5 MG/ML
INJECTION, SOLUTION INTRASPINAL AS NEEDED
Status: DISCONTINUED | OUTPATIENT
Start: 2020-09-01 | End: 2020-09-01 | Stop reason: HOSPADM

## 2020-09-01 RX ORDER — CELECOXIB 200 MG/1
200 CAPSULE ORAL EVERY 12 HOURS
Status: DISCONTINUED | OUTPATIENT
Start: 2020-09-01 | End: 2020-09-03 | Stop reason: HOSPADM

## 2020-09-01 RX ORDER — NALOXONE HYDROCHLORIDE 0.4 MG/ML
.2-.4 INJECTION, SOLUTION INTRAMUSCULAR; INTRAVENOUS; SUBCUTANEOUS
Status: DISCONTINUED | OUTPATIENT
Start: 2020-09-01 | End: 2020-09-03 | Stop reason: HOSPADM

## 2020-09-01 RX ORDER — HYDROMORPHONE HYDROCHLORIDE 1 MG/ML
1 INJECTION, SOLUTION INTRAMUSCULAR; INTRAVENOUS; SUBCUTANEOUS
Status: DISCONTINUED | OUTPATIENT
Start: 2020-09-01 | End: 2020-09-03 | Stop reason: HOSPADM

## 2020-09-01 RX ORDER — ONDANSETRON 4 MG/1
4 TABLET, ORALLY DISINTEGRATING ORAL
Status: DISCONTINUED | OUTPATIENT
Start: 2020-09-01 | End: 2020-09-03 | Stop reason: HOSPADM

## 2020-09-01 RX ORDER — ACETAMINOPHEN 650 MG/1
650 SUPPOSITORY RECTAL ONCE
Status: DISCONTINUED | OUTPATIENT
Start: 2020-09-01 | End: 2020-09-01 | Stop reason: SDUPTHER

## 2020-09-01 RX ORDER — SODIUM CHLORIDE 0.9 % (FLUSH) 0.9 %
5-40 SYRINGE (ML) INJECTION EVERY 8 HOURS
Status: DISCONTINUED | OUTPATIENT
Start: 2020-09-01 | End: 2020-09-03 | Stop reason: HOSPADM

## 2020-09-01 RX ORDER — DEXAMETHASONE SODIUM PHOSPHATE 100 MG/10ML
10 INJECTION INTRAMUSCULAR; INTRAVENOUS ONCE
Status: ACTIVE | OUTPATIENT
Start: 2020-09-02 | End: 2020-09-03

## 2020-09-01 RX ORDER — SODIUM CHLORIDE 0.9 % (FLUSH) 0.9 %
5-40 SYRINGE (ML) INJECTION AS NEEDED
Status: DISCONTINUED | OUTPATIENT
Start: 2020-09-01 | End: 2020-09-03 | Stop reason: HOSPADM

## 2020-09-01 RX ORDER — FENTANYL CITRATE 50 UG/ML
100 INJECTION, SOLUTION INTRAMUSCULAR; INTRAVENOUS ONCE
Status: COMPLETED | OUTPATIENT
Start: 2020-09-01 | End: 2020-09-01

## 2020-09-01 RX ORDER — PROPOFOL 10 MG/ML
INJECTION, EMULSION INTRAVENOUS AS NEEDED
Status: DISCONTINUED | OUTPATIENT
Start: 2020-09-01 | End: 2020-09-01 | Stop reason: HOSPADM

## 2020-09-01 RX ORDER — LISINOPRIL 5 MG/1
10 TABLET ORAL DAILY
Status: DISCONTINUED | OUTPATIENT
Start: 2020-09-02 | End: 2020-09-03 | Stop reason: HOSPADM

## 2020-09-01 RX ORDER — EPHEDRINE SULFATE/0.9% NACL/PF 50 MG/5 ML
SYRINGE (ML) INTRAVENOUS AS NEEDED
Status: DISCONTINUED | OUTPATIENT
Start: 2020-09-01 | End: 2020-09-01 | Stop reason: HOSPADM

## 2020-09-01 RX ORDER — ACETAMINOPHEN 500 MG
1000 TABLET ORAL EVERY 6 HOURS
Status: DISCONTINUED | OUTPATIENT
Start: 2020-09-01 | End: 2020-09-03 | Stop reason: HOSPADM

## 2020-09-01 RX ADMIN — CEFAZOLIN 3 G: 1 INJECTION, POWDER, FOR SOLUTION INTRAVENOUS at 08:25

## 2020-09-01 RX ADMIN — SODIUM CHLORIDE, SODIUM LACTATE, POTASSIUM CHLORIDE, AND CALCIUM CHLORIDE 125 ML/HR: 600; 310; 30; 20 INJECTION, SOLUTION INTRAVENOUS at 07:19

## 2020-09-01 RX ADMIN — PROPOFOL: 10 INJECTION, EMULSION INTRAVENOUS at 09:20

## 2020-09-01 RX ADMIN — OXYCODONE 10 MG: 5 TABLET ORAL at 23:36

## 2020-09-01 RX ADMIN — ASPIRIN 81 MG: 81 TABLET, COATED ORAL at 21:34

## 2020-09-01 RX ADMIN — PROPOFOL 40 MG: 10 INJECTION, EMULSION INTRAVENOUS at 08:30

## 2020-09-01 RX ADMIN — PHENYLEPHRINE HYDROCHLORIDE 150 MCG: 10 INJECTION INTRAVENOUS at 09:44

## 2020-09-01 RX ADMIN — OXYCODONE 10 MG: 5 TABLET ORAL at 15:34

## 2020-09-01 RX ADMIN — OXYCODONE 10 MG: 5 TABLET ORAL at 12:04

## 2020-09-01 RX ADMIN — PHENYLEPHRINE HYDROCHLORIDE 200 MCG: 10 INJECTION INTRAVENOUS at 09:41

## 2020-09-01 RX ADMIN — PHENYLEPHRINE HYDROCHLORIDE 200 MCG: 10 INJECTION INTRAVENOUS at 08:43

## 2020-09-01 RX ADMIN — BUPIVACAINE HYDROCHLORIDE IN DEXTROSE 2 ML: 7.5 INJECTION, SOLUTION SUBARACHNOID at 08:28

## 2020-09-01 RX ADMIN — TRANEXAMIC ACID 1000 MG: 100 INJECTION, SOLUTION INTRAVENOUS at 08:37

## 2020-09-01 RX ADMIN — Medication 1 AMPULE: at 21:33

## 2020-09-01 RX ADMIN — PHENYLEPHRINE HYDROCHLORIDE 150 MCG: 10 INJECTION INTRAVENOUS at 09:27

## 2020-09-01 RX ADMIN — CEFAZOLIN SODIUM 3 G: 100 INJECTION, POWDER, LYOPHILIZED, FOR SOLUTION INTRAVENOUS at 15:34

## 2020-09-01 RX ADMIN — CELECOXIB 200 MG: 200 CAPSULE ORAL at 21:34

## 2020-09-01 RX ADMIN — SODIUM CHLORIDE, SODIUM LACTATE, POTASSIUM CHLORIDE, AND CALCIUM CHLORIDE: 600; 310; 30; 20 INJECTION, SOLUTION INTRAVENOUS at 09:56

## 2020-09-01 RX ADMIN — PROPOFOL 100 MCG/KG/MIN: 10 INJECTION, EMULSION INTRAVENOUS at 08:30

## 2020-09-01 RX ADMIN — PHENYLEPHRINE HYDROCHLORIDE 200 MCG: 10 INJECTION INTRAVENOUS at 09:50

## 2020-09-01 RX ADMIN — ACETAMINOPHEN 1000 MG: 500 TABLET, FILM COATED ORAL at 12:04

## 2020-09-01 RX ADMIN — Medication 3 AMPULE: at 07:19

## 2020-09-01 RX ADMIN — OXYCODONE 10 MG: 5 TABLET ORAL at 19:35

## 2020-09-01 RX ADMIN — Medication 5 MG: at 08:59

## 2020-09-01 RX ADMIN — PROMETHAZINE HYDROCHLORIDE 6.25 MG: 25 INJECTION INTRAMUSCULAR; INTRAVENOUS at 15:48

## 2020-09-01 RX ADMIN — ONDANSETRON 4 MG: 4 TABLET, ORALLY DISINTEGRATING ORAL at 12:04

## 2020-09-01 RX ADMIN — CELECOXIB 200 MG: 200 CAPSULE ORAL at 07:19

## 2020-09-01 RX ADMIN — Medication 5 MG: at 08:44

## 2020-09-01 RX ADMIN — ACETAMINOPHEN 1000 MG: 500 TABLET, FILM COATED ORAL at 17:41

## 2020-09-01 RX ADMIN — MIDAZOLAM 2 MG: 1 INJECTION INTRAMUSCULAR; INTRAVENOUS at 07:58

## 2020-09-01 RX ADMIN — Medication 5 ML: at 21:34

## 2020-09-01 RX ADMIN — ACETAMINOPHEN 1000 MG: 500 TABLET, FILM COATED ORAL at 23:36

## 2020-09-01 RX ADMIN — ROPIVACAINE HYDROCHLORIDE 20 ML: 2 INJECTION, SOLUTION EPIDURAL; INFILTRATION at 08:01

## 2020-09-01 RX ADMIN — ONDANSETRON 4 MG: 4 TABLET, ORALLY DISINTEGRATING ORAL at 15:34

## 2020-09-01 RX ADMIN — FENTANYL CITRATE 50 MCG: 50 INJECTION INTRAMUSCULAR; INTRAVENOUS at 07:58

## 2020-09-01 NOTE — ANESTHESIA POSTPROCEDURE EVALUATION
Procedure(s):  LEFT KNEE ARTHROPLASTY TOTAL ROBOTIC ASSISTED/ FERNANDA/ DAO.     spinal    Anesthesia Post Evaluation      Multimodal analgesia: multimodal analgesia used between 6 hours prior to anesthesia start to PACU discharge  Patient location during evaluation: PACU  Patient participation: complete - patient participated  Level of consciousness: awake  Pain management: adequate  Airway patency: patent  Anesthetic complications: no  Cardiovascular status: acceptable and stable  Respiratory status: acceptable and nasal cannula  Hydration status: acceptable  Post anesthesia nausea and vomiting:  none      INITIAL Post-op Vital signs:   Vitals Value Taken Time   /58 9/1/2020 10:32 AM   Temp 37.2 °C (99 °F) 9/1/2020 10:20 AM   Pulse 77 9/1/2020 10:32 AM   Resp 16 9/1/2020 10:32 AM   SpO2 100 % 9/1/2020 10:32 AM

## 2020-09-01 NOTE — ANESTHESIA PROCEDURE NOTES
Peripheral Block    Start time: 9/1/2020 7:58 AM  End time: 9/1/2020 8:01 AM  Performed by: Armen Sifuentes MD  Authorized by: Armen Sifuentes MD       Pre-procedure: Indications: at surgeon's request and post-op pain management    Preanesthetic Checklist: patient identified, risks and benefits discussed, site marked, timeout performed, anesthesia consent given and patient being monitored    Timeout Time: 07:57          Block Type:   Block Type:   Adductor canal  Laterality:  Left  Monitoring:  Responsive to questions, standard ASA monitoring, continuous pulse ox, oxygen, frequent vital sign checks and heart rate  Injection Technique:  Single shot  Procedures: ultrasound guided    Patient Position: supine  Prep: chlorhexidine    Location:  Mid thigh  Needle Type:  Stimuplex  Needle Gauge:  22 G  Needle Localization:  Ultrasound guidance    Assessment:  Number of attempts:  1  Injection Assessment:  Incremental injection every 5 mL, no paresthesia, ultrasound image on chart, local visualized surrounding nerve on ultrasound, negative aspiration for blood and no intravascular symptoms  Patient tolerance:  Patient tolerated the procedure well with no immediate complications

## 2020-09-01 NOTE — ANESTHESIA PREPROCEDURE EVALUATION
Relevant Problems   No relevant active problems       Anesthetic History               Review of Systems / Medical History  Patient summary reviewed, nursing notes reviewed and pertinent labs reviewed    Pulmonary        Sleep apnea: CPAP           Neuro/Psych              Cardiovascular    Hypertension: well controlled              Exercise tolerance: >4 METS     GI/Hepatic/Renal                Endo/Other    Diabetes: well controlled, type 2, using insulin    Obesity and arthritis     Other Findings            Physical Exam    Airway  Mallampati: II  TM Distance: 4 - 6 cm  Neck ROM: decreased range of motion   Mouth opening: Normal     Cardiovascular  Regular rate and rhythm,  S1 and S2 normal,  no murmur, click, rub, or gallop             Dental  No notable dental hx       Pulmonary  Breath sounds clear to auscultation               Abdominal  GI exam deferred       Other Findings            Anesthetic Plan    ASA: 3  Anesthesia type: spinal      Post-op pain plan if not by surgeon: peripheral nerve block single      Anesthetic plan and risks discussed with: Patient

## 2020-09-01 NOTE — PROGRESS NOTES
09/01/20 1504   Oxygen Therapy   O2 Sat (%) 98 %   Pulse via Oximetry 75 beats per minute   O2 Device Room air   O2 Flow Rate (L/min) 0 l/min   Incentive Spirometry Treatment   Actual Volume (ml) 1000 ml   Number of Attempts Greater than 3   Patient achieved    1000   Ml/sec on IS. Patient encouraged to do every hour while awake-patient agreed and demonstrated. No shortness of breath or distress noted. BS are clear b/l.    [facility] notes reviewed- Patient brought Home CPAP

## 2020-09-01 NOTE — PROGRESS NOTES
Received to room from PACU. Alert and oriented x3. Left knee Aquacel bandage is clean, dry, and intact. Sensation present, pulses 2+, plantar/dorsiflexion absent in bilateral lower extremities. Ice applied to left knee. No pain expressed. Lung sounds clear bilaterally. S1 and S2 heard. Family at bedside. Discussed diet and pain control. Oriented to room and bed functions. Bed locked, in lowest position, call light within reach.

## 2020-09-01 NOTE — PERIOP NOTES
TRANSFER - OUT REPORT:    Verbal report given to Estiven Sprague RN on [employer]  being transferred to [id number] for routine post - op       Report consisted of patients Situation, Background, Assessment and   Recommendations(SBAR). Information from the following report(s) SBAR, OR Summary, Procedure Summary, Intake/Output and MAR was reviewed with the receiving nurse. Lines:   Peripheral IV 09/01/20 Left Hand (Active)   Site Assessment Clean, dry, & intact 09/01/20 1022   Phlebitis Assessment 0 09/01/20 1022   Infiltration Assessment 0 09/01/20 1022   Dressing Status Clean, dry, & intact 09/01/20 1022   Dressing Type Tape;Transparent 09/01/20 1022   Hub Color/Line Status Green; Infusing;Flushed;Patent 09/01/20 1022   Action Taken Blood drawn 09/01/20 4734        Opportunity for questions and clarification was provided. Patient transported with:   Tech    VTE prophylaxis orders have been written for [employer]. Patient and family given floor number and nurses name. Family updated re: pt status after security code verified.

## 2020-09-01 NOTE — ANESTHESIA PROCEDURE NOTES
Spinal Block    Start time: 9/1/2020 8:25 AM  End time: 9/1/2020 8:28 AM  Performed by: Evan Dial MD  Authorized by: Evan Dial MD     Pre-procedure:   Indications: primary anesthetic  Preanesthetic Checklist: patient identified, risks and benefits discussed, anesthesia consent, site marked, patient being monitored and timeout performed    Timeout Time: 08:24          Spinal Block:   Patient Position:  Seated  Prep Region:  Lumbar  Prep: chlorhexidine and patient draped      Location:  L3-4  Technique:  Single shot        Needle:   Needle Type:  Quincke  Needle Gauge:  22 G  Attempts:  1      Events: CSF confirmed, no blood with aspiration and no paresthesia        Assessment:  Insertion:  Uncomplicated  Patient tolerance:  Patient tolerated the procedure well with no immediate complications

## 2020-09-01 NOTE — PERIOP NOTES
TRANSFER - OUT REPORT:    Verbal report given to [facility] RN(name) on [facility]  being transferred to PRE-OP(unit) for routine progression of care       Report consisted of patients Situation, Background, Assessment and   Recommendations(SBAR). Information from the following report(s) SBAR, MAR and Recent Results was reviewed with the receiving nurse. Lines:   Peripheral IV 09/01/20 Left Hand (Active)   Site Assessment Clean, dry, & intact 09/01/20 0716   Phlebitis Assessment 0 09/01/20 0716   Infiltration Assessment 0 09/01/20 0716   Dressing Status Clean, dry, & intact 09/01/20 0716   Dressing Type Tape;Transparent 09/01/20 0716   Hub Color/Line Status Green; Infusing 09/01/20 0716   Action Taken Blood drawn 09/01/20 1910        Opportunity for questions and clarification was provided.       Patient transported with:   [employer]

## 2020-09-01 NOTE — PERIOP NOTES
TRANSFER - IN REPORT:    Verbal report received from Danielle Costello rn(name) on [facility]illi[facility]  being received from 2rd ortho(unit) for routine progression of care      Report consisted of patients Situation, Background, Assessment and   Recommendations(SBAR). Information from the following report(s) SBAR and MAR was reviewed with the receiving nurse. Opportunity for questions and clarification was provided. Assessment completed upon patients arrival to unit and care assumed.

## 2020-09-01 NOTE — PROGRESS NOTES
Problem: Mobility Impaired (Adult and Pediatric)  Goal: *Acute Goals and Plan of Care (Insert Text)  Outcome: Progressing Towards Goal  Note: GOALS (1-4 days):  (1.)Ms. Joanna Martinez will move from supine to sit and sit to supine  in bed with STAND BY ASSIST.  (2.)Ms. Joanna Martinez will transfer from bed to chair and chair to bed with STAND BY ASSIST using the least restrictive device. (3.)Ms. Paris will ambulate with STAND BY ASSIST for 100 feet with the least restrictive device. (4.)Ms. Paris will ambulate up/down 5 steps with right railing with MINIMAL ASSIST with device as needed. (5.)Ms. Paris will increase left knee ROM to 5°-80°.  ________________________________________________________________________________________________       PHYSICAL THERAPY JOINT CAMP TKA: Initial Assessment and PM 9/1/2020  INPATIENT: Hospital Day: 1  Payor: JAZLYN JAFFE / Plan: [place] BLUE CROSS OUT OF STATE / Product Type: PPO /      NAME/AGE/GENDER: Axel Lynn is a 63 y.o. female   PRIMARY DIAGNOSIS:  Bilateral primary osteoarthritis of knee [M17.0]   Procedure(s) and Anesthesia Type:     * LEFT KNEE ARTHROPLASTY TOTAL ROBOTIC ASSISTED/ FERNANDA/ DAO - Spinal (Left)  ICD-10: Treatment Diagnosis:    Pain in Left Knee (M25.562)  Stiffness of Left Knee, Not elsewhere classified (M25.662)  Difficulty in walking, Not elsewhere classified (R26.2)      ASSESSMENT:     Ms. Joanna Martinez presents with decreased functional mobility and gait as well as decreased rom and strength of left LE s/p left tka. She plans to go home with HHPT.     This section established at most recent assessment   PROBLEM LIST (Impairments causing functional limitations):  Decreased Strength  Decreased ADL/Functional Activities  Decreased Transfer Abilities  Decreased Ambulation Ability/Technique  Decreased Balance  Increased Pain  Decreased Activity Tolerance  Decreased Flexibility/Joint Mobility  Decreased [place] with Home Exercise Program   INTERVENTIONS PLANNED: (Benefits and precautions of physical therapy have been discussed with the patient.)  Bed Mobility  Cold  Gait Training  Home Exercise Program (HEP)  Range of Motion (ROM)  Therapeutic Activites  Therapeutic Exercise/Strengthening  Transfer Training     TREATMENT PLAN: Frequency/Duration: Follow patient BID for duration of hospital stay to address above goals. Rehabilitation Potential For Stated Goals: Good     RECOMMENDED REHABILITATION/EQUIPMENT: (at time of discharge pending progress): Continue Skilled Therapy and Home Health: Physical Therapy. HISTORY:   History of Present Injury/Illness (Reason for Referral):  S/p left tka  Past Medical History/Comorbidities:   Ms. Tiffany Limon  has a past medical history of Hypertension, SAWYER (obstructive sleep apnea), Osteoarthritis, and Type 2 diabetes mellitus ([facility] [address] Ms. Tiffany Limon  has a past surgical history that includes hx tonsillectomy and hx lap cholecystectomy.   Social History/Living Environment:      Prior Level of Function/Work/Activity:  independent   Number of Personal Factors/Comorbidities that affect the Plan of Care: 1-2: MODERATE COMPLEXITY   EXAMINATION:   Most Recent Physical Functioning:      Gross Assessment  AROM: Within functional limits(right LE)  Strength: Generally decreased, functional(right LE)          LLE AROM  L Knee Flexion: 60  L Knee Extension: 10          Bed Mobility  Supine to Sit: Contact guard assistance    Transfers  Sit to Stand: Minimum assistance  Stand to Sit: Minimum assistance  Bed to Chair: Minimum assistance    Balance  Sitting: Intact  Standing: With support              Weight Bearing Status  Left Side Weight Bearing: As tolerated  Distance (ft): 3 Feet (ft)  Ambulation - Level of Assistance: Minimal assistance;Contact guard assistance  Assistive Device: Walker, rolling  Speed/Lizzie: Delayed  Step Length: Left shortened;Right shortened  Stance: Left decreased  Gait Abnormalities: Antalgic;Decreased step clearance  Interventions: Safety awareness training;Verbal cues     Braces/Orthotics:     Left Knee Cold  Type: Cryocuff      Body Structures Involved:  Bones  Joints  Muscles  Ligaments Body Functions Affected: Movement Related Activities and Participation Affected: Mobility   Number of elements that affect the Plan of Care: 3: MODERATE COMPLEXITY   CLINICAL PRESENTATION:   Presentation: Stable and uncomplicated: LOW COMPLEXITY   CLINICAL DECISION MAKING:   [facility] AM-PAC 6 Clicks   Basic Mobility Inpatient Short Form  How much difficulty does the patient currently have. .. Unable A Lot A Little None   1. Turning over in bed (including adjusting bedclothes, sheets and blankets)? [] 1   [] 2   [x] 3   [] 4   2. Sitting down on and standing up from a chair with arms ( e.g., wheelchair, bedside commode, etc.)   [] 1   [] 2   [x] 3   [] 4   3. Moving from lying on back to sitting on the side of the bed? [] 1   [] 2   [x] 3   [] 4   How much help from another person does the patient currently need. .. Total A Lot A Little None   4. Moving to and from a bed to a chair (including a wheelchair)? [] 1   [] 2   [x] 3   [] 4   5. Need to walk in hospital room? [] 1   [] 2   [x] 3   [] 4   6. Climbing 3-5 steps with a railing? [] 1   [x] 2   [] 3   [] 4   © 2007, Trustees of [facility], under license to [employer]. All rights reserved     Score:  Initial: 17 Most Recent: X (Date: -- )    Interpretation of Tool:  Represents activities that are increasingly more difficult (i.e. Bed mobility, Transfers, Gait). Medical Necessity:     Patient is expected to demonstrate progress in   strength, range of motion, and balance   to   decrease assistance required with exercises and functional mobility  . Reason for Services/Other Comments:  Patient   continues to require present interventions due to patient's inability to perform exercises and functional mobility independently  .    Use of outcome tool(s) and clinical judgement create a POC that gives a: Clear prediction of patient's progress: LOW COMPLEXITY            TREATMENT:   (In addition to Assessment/Re-Assessment sessions the following treatments were rendered)     Pre-treatment Symptoms/Complaints:  nausea, knee pain  Pain Initial:      Post Session:  mild     Therapeutic Exercise: (8 Minutes):  Exercises per grid below to improve mobility and strength. Required minimal visual, verbal, and manual cues to promote proper body alignment, promote proper body posture, and promote proper body mechanics. Progressed range and repetitions as indicated. Date:  9/1/20 Date:   Date:     ACTIVITY/EXERCISE AM PM AM PM AM PM   GROUP THERAPY  []  []  []  []  []  []   Ankle Pumps  10a       Quad Sets  10a       Gluteal Sets  10a       Hip ABd/ADduction  10a       Straight Leg Raises         Knee Slides  10a       Short Arc Quads         Long Arc Quads         Chair Slides                  B = bilateral; AA = active assistive; A = active; P = passive      Treatment/Session Assessment:     Response to Treatment:  did ok, nausea limiting activity. Education:  [x] Home Exercises  [x] Fall Precautions  []  [] D/C Instruction Review  [x] Knee Prosthesis Review  [x] Walker Management/Safety [] Adaptive Equipment as Needed       Interdisciplinary Collaboration:   Occupational Therapist  Registered Nurse    After treatment position/precautions:   Up in chair  Bed/Chair-wheels locked  Bed in low position  Caregiver at bedside  Call light within reach  Family at bedside    Compliance with Program/Exercises: Will assess as treatment progresses. Recommendations/Intent for next treatment session:  Treatment next visit will focus on increasing Ms. Paris's independence with bed mobility, transfers, gait training, strength/ROM exercises, modalities for pain, and patient education.       Total Treatment Duration:  PT Patient Time In/Time Out  Time In: 1320  Time Out: 0752 [facility]

## 2020-09-01 NOTE — PROGRESS NOTES
Problem: Self Care Deficits Care Plan (Adult)  Goal: *Acute Goals and Plan of Care (Insert Text)  Description: GOALS:   DISCHARGE GOALS (in preparation for going home/rehab):  3 days  1. Ms. Duyen Espino will perform one lower body dressing activity with minimal assistance required to demonstrate improved functional mobility and safety. 2.  Ms. Duyen Espino will perform one lower body bathing activity with minimal assistance required to demonstrate improved functional mobility and safety. 3.  Ms. Duyen Espino will perform toileting/toilet transfer with contact guard assistance to demonstrate improved functional mobility and safety. 4.  Ms. Duyen Espino will perform shower transfer with contact guard assistance to demonstrate improved functional mobility and safety. Outcome: Progressing Towards Goal     JOINT CAMP OCCUPATIONAL THERAPY TKA: Initial Assessment, Daily Note, and PM 9/1/2020  INPATIENT: Hospital Day: 1  Payor: JAZLYN JAFFE / Plan: [place] BLUE CROSS OUT OF STATE / Product Type: PPO /      NAME/AGE/GENDER: Alexa Peralta is a 63 y.o. female   PRIMARY DIAGNOSIS:  Bilateral primary osteoarthritis of knee [M17.0]   Procedure(s) and Anesthesia Type:     * LEFT KNEE ARTHROPLASTY TOTAL ROBOTIC ASSISTED/ FERNANDA/ DAO - Spinal (Left)  ICD-10: Treatment Diagnosis:    Pain in Left Knee (M25.562)  Stiffness of Left Knee, Not elsewhere classified (W40.277)      ASSESSMENT:     Ms. Duyen Espino is s/p L TKA and presents with decreased weight bearing on L LE and decreased independence with functional mobility and activities of daily living as compared to baseline level of function and safety. Patient would benefit from skilled Occupational Therapy to maximize independence and safety with self-care task and functional mobility. Pt would also benefit from education on adaptive equipment and safety precautions in preparation for going home.     Pt completed dressing and functional transfers this pm.     This section established at most recent assessment   PROBLEM LIST (Impairments causing functional limitations):  Decreased Strength  Decreased ADL/Functional Activities  Decreased Transfer Abilities  Increased Pain  Increased Fatigue  Decreased Flexibility/Joint Mobility  Decreased Knowledge of Precautions   INTERVENTIONS PLANNED: (Benefits and precautions of occupational therapy have been discussed with the patient.)  Activities of daily living training  Adaptive equipment training  Balance training  Clothing management  Donning&doffing training  Theraputic activity     TREATMENT PLAN: Frequency/Duration: Follow patient qd to address above goals. Rehabilitation Potential For Stated Goals: Good     RECOMMENDED REHABILITATION/EQUIPMENT: (at time of discharge pending progress): Continue Skilled Therapy and Home Health: Physical Therapy. OCCUPATIONAL PROFILE AND HISTORY:   History of Present Injury/Illness (Reason for Referral): Pt presents this date s/p (L) TKA. Past Medical History/Comorbidities:   Ms. Riva Favre  has a past medical history of Hypertension, SAWYER (obstructive sleep apnea), Osteoarthritis, and Type 2 diabetes mellitus ([facility] [address] Ms. Riva Favre  has a past surgical history that includes hx tonsillectomy and hx lap cholecystectomy. Social History/Living Environment:      Prior Level of Function/Work/Activity:  independent     Number of Personal Factors/Comorbidities that affect the Plan of Care: Brief history (0):  LOW COMPLEXITY   ASSESSMENT OF OCCUPATIONAL PERFORMANCE[de-identified]   Most Recent Physical Functioning:   Balance  Sitting: Intact  Standing: With support;Pull to stand       Gross Assessment: Yes  Gross Assessment  AROM: Within functional limits(BUE)  Strength:  Within functional limits(BUE)  Coordination: Within functional limits(BUE)  Tone: Normal  Sensation: Intact          LLE AROM  L Knee Flexion: 60  L Knee Extension: 10      Vision  Acuity: Within Defined Limits    Mental Status  Neurologic State: Alert  Orientation Level: Oriented X4  Cognition: Follows commands  Perception: Appears intact  Perseveration: No perseveration noted  Safety/Judgement: Fall prevention                Basic ADLs (From Assessment) Complex ADLs (From Assessment)   Basic ADL  Feeding: Setup  Oral Facial Hygiene/Grooming: Setup  Bathing: Minimum assistance  Upper Body Dressing: Setup  Lower Body Dressing: Moderate assistance  Toileting: Minimum assistance     Grooming/Bathing/Dressing Activities of Daily Living     Cognitive Retraining  Safety/Judgement: Fall prevention                 Functional Transfers  Bathroom Mobility: Minimum assistance  Toilet Transfer : Minimum assistance  Shower Transfer: Minimum assistance     Bed/Mat Mobility  Supine to Sit: Contact guard assistance  Sit to Stand: Contact guard assistance;Minimum assistance  Stand to Sit: Contact guard assistance;Minimum assistance  Bed to Chair: Contact guard assistance;Minimum assistance         Physical Skills Involved:  Balance  Strength  Activity Tolerance Cognitive Skills Affected (resulting in the inability to perform in a timely and safe manner):  none Psychosocial Skills Affected:  none   Number of elements that affect the Plan of Care: 1-3:  LOW COMPLEXITY   CLINICAL DECISION MAKIN[address] [place] [id number] AM-PAC 6 Clicks   Daily Activity Inpatient Short Form  How much help from another person does the patient currently need. .. Total A Lot A Little None   1. Putting on and taking off regular lower body clothing? [] 1   [x] 2   [] 3   [] 4   2. Bathing (including washing, rinsing, drying)? [] 1   [] 2   [x] 3   [] 4   3. Toileting, which includes using toilet, bedpan or urinal?   [] 1   [] 2   [x] 3   [] 4   4. Putting on and taking off regular upper body clothing? [] 1   [] 2   [] 3   [x] 4   5. Taking care of personal grooming such as brushing teeth? [] 1   [] 2   [] 3   [x] 4   6. Eating meals?    [] 1   [] 2   [] 3   [x] 4   © , Trustees of [address] [place] [address] [address] [address], under license to [employer]. All rights reserved     Score:  Initial: 20 Most Recent: X (Date: -- )    Interpretation of Tool:  Represents activities that are increasingly more difficult (i.e. Bed mobility, Transfers, Gait). Medical Necessity:     Patient is expected to demonstrate progress in   strength, balance, coordination, and functional technique   to   increase independence with self care and functional mobility  . Reason for Services/Other Comments:  Patient continues to require skilled intervention due to   Decrease self care and functional mobility   . Use of outcome tool(s) and clinical judgement create a POC that gives a: LOW COMPLEXITY            TREATMENT:   (In addition to Assessment/Re-Assessment sessions the following treatments were rendered)     Pre-treatment Symptoms/Complaints: tolerated sitting in recliner  Pain: Initial:   Pain Intensity 1: 0  Post Session:  0     Self Care: (10): Procedure(s) (per grid) utilized to improve and/or restore self-care/home management as related to dressing, grooming, and transfers . Required minimal verbal and   cueing to facilitate activities of daily living skills and compensatory activities. Assessment/Reassessment (5)    Treatment/Session Assessment:     Response to Treatment:  tolerated well. Education:  [] Home Exercises  [x] Fall Precautions  [] Hip Precautions [] Going Home Video  [x] Knee/Hip Prosthesis Review  [x] Walker Management/Safety [x] Adaptive Equipment as Needed       Interdisciplinary Collaboration:   Physical Therapist  Occupational Therapist  Registered Nurse    After treatment position/precautions:   Up in chair  Bed/Chair-wheels locked  Caregiver at bedside  Call light within reach  RN notified  LEs reclined      Compliance with Program/Exercises: Compliant all of the time. Recommendations/Intent for next treatment session:  Treatment next visit will focus on increasing Ms. Paris's independence with bed mobility, transfers, self care, functional mobility, modalities for pain, and patient education.       Total Treatment Duration:  OT Patient Time In/Time Out  Time In: 1305  Time Out: 1612 Benjy Garcia

## 2020-09-01 NOTE — PROGRESS NOTES
TRANSFER - IN REPORT:    Verbal report received from Ivania Dejesus, [facility] on [employer]  being received from PACU for routine post - op      Report consisted of patients Situation, Background, Assessment and   Recommendations(SBAR). Information from the following report(s) SBAR, Kardex, OR Summary, Procedure Summary, Intake/Output and MAR was reviewed with the receiving nurse. Opportunity for questions and clarification was provided. Assessment completed upon patients arrival to unit and care assumed.

## 2020-09-01 NOTE — OP NOTES
[address] [place] [address] Robotic Assisted Total Knee Arthroplasty: Posterior Cruciate Retaining       Patient:Trinh Paris   : 1955  Medical Record [id number]      Pre-operative Diagnosis:  Bilateral primary osteoarthritis of knee [M17.0]  Post-operative Diagnosis: Bilateral primary osteoarthritis of knee  Location: Kevin [facility]    Date of Procedure: 2020  Surgeon: Jorge Mcdowell MD  Assistant:  ANA Dye    Anesthesia: Spinal and  nerve block  Modifier: 22   BMI:Body mass index is 38.77 kg/m². CPT- 27388- Total knee arthroplasty           25677- Other procedures on musculoskeletal system            0055T- Computer assisted surgical navigation     Procedure:  left Cementless Cruciate Retained Total Knee Arthroplasty with a cemented patella button    Tourniquet Time: none    EBL:  150 cc         The complexity of the total joint surgery requires the use of a first assistant for positioning, retraction and assistance in closure. This BMI for this patient is Body mass index is 38.77 kg/m². Meli Covington BMI's between 30 and 38.9 are considered obese, while a BMI over 39 indicates the patient is morbidly obese. Obese and Morbidly obese patients make exposure and retraction extremely difficult. The patient's large size also makes implantation and proper insertion of total joint implants more difficult. Stephen La was brought to the operating room and positioned on the operating table. She was anesthestized with anesthesia. IV antibiotics was administered. Prior to the incision being made a timeout was called identifying the patient, procedure ,operative side and surgeon The operative leg was prepped and draped in the usual sterile manner. An anterior longitudinal incision was accomplished just medial to the tibial tubercle and extending approximal 6 centimeters proximal to the superior pole of the patella.   A medial parapatellar capsular incision was performed. The medial capsular flap was elevated around to the insertion of the semimembranous tendon. The patella was everted and the knee flexed and externally rotated. The medial and external menisci were excised. The lateral half of the fat pad excised and the patella femoral ligament was released. The anterior cruciate ligament was resect and the posterior cruciate ligament was retained. The femoral and tibial arrays were pinned in place and registered with the [employer]. The patient landmarks were collected and the tibial and femoral checkpoints were registered and verified. The preresection balancing was performed. The osteophytes were then removed as exposed on the femoral and tibial surfaces. Utilizing the [facility] robotic arm the femoral and tibial cuts were accomplished. The tibia was sized. The tibial base plate was pinned into place with the appropriate external rotation and stem site prepared. A trial femoral component and poly was placed. A preliminary range of motion was accomplished with the trial components. The patient was found to obtain full extension as well as appropriate flexion. The patient's ligaments were stable in flexion and extension to medial and lateral stressing and the alignment was through the appropriate mechanical axis. Additional surgical releases were none. The patella was then everted. Osteophytes were removed. The patella articular surface was inspected and due to wear the patella resurfacing was done. All trial components were removed. The real implants were opened: Sizes selected were a size 5 femoral, 6 tibial, and a 9 mm polyethylene insert. The knee was irrigated. The real components were impacted into place. A 33 mm patella button was cemented into place. Jessica Low knee was placed through range of motion and noted to be stable as mentioned above with the trial components.  The operative knee was injected with 60 cc of Naropin, 10 cc's of morphine and 1 cc of 30 mg of Toradol. The knee was then soaked with a diluted betadine solution for approximately 3 min. This was then thoroughly irrigated. The capsular layer was closed using a #1 PDS suture and a #1 Vicryl. Then, 1 gram (100 mg/ml) of Transexamic Acid and 1 gram of Vancomycin was injected into the joint space. The subcutaneous layers were closed using 2-0 vicryl. The skin was closed using staples which were applied in occlusive fashion and sterile bandage applied. An Iceman cryo pad was applied on the operative leg. Sponge count and needle counts were correct. Chela Wilson left the operating room     Implants:   Implant Name Type Inv.  Item Serial No.  Lot No. LRB No. Used Action   DEPUY BONE CEMENT     [id number] Left 1 Implanted   COMPNT FEM CR TRIATHLN 5 L PA --  - [id number]  COMPNT FEM CR TRIATHLN 5 L PA --   [employer] HOW [id number] Left 1 Implanted   BASEPLT TIB PC TRITNM SZ 6 -- TRIATHLON - [id number]  BASEPLT TIB PC TRITNM SZ 6 -- TRIATHLON  [employer] HOW [id number] Left 1 Implanted   INSERT TIB ARTC BEAR SZ6 9MM --  - [id number]  INSERT TIB ARTC BEAR SZ6 9MM --   [employer] HOW [id number] Left 1 Implanted   COMPNT PAT SYM TRIATHLN 33X9MM --  - [id number]  COMPNT PAT SYM TRIATHLN 33X9MM --   [employer] HOW [id number] Left 1 Implanted         Signed By: Bijal Salinas MD   9/1/2020,  9:42 AM

## 2020-09-01 NOTE — PROGRESS NOTES
Care Management Interventions  PCP Verified by CM: Yes  Mode of Transport at Discharge: Self  Transition of Care Consult (CM Consult): [address]: Yes  Discharge Durable Medical Equipment: Yes  Physical Therapy Consult: Yes  Occupational Therapy Consult: Yes  Current Support Network: Own Home  Confirm Follow Up Transport: Family  The Plan for Transition of Care is Related to the Following Treatment Goals : return to independent function  The Patient and/or Patient Representative was Provided with a Choice of Provider and Agrees with the Discharge Plan?: Yes  Freedom of Choice List was Provided with Basic Dialogue that Supports the Patient's Individualized Plan of Care/Goals, Treatment Preferences and Shares the Quality Data Associated with the Providers?: Yes  Discharge Location  Discharge Placement: Home with home health    Patient is a 63y.o. year old female admitted for Left TKA . Patient plans to return home on discharge. Order received to arrange home health. Patient without preference towards agency. Referral sent to [facility]. Patient requesting we arrange a walker and bedside commode. Referral sent to [address] [place] [address]. delivered to the hospital room prior to discharge. Will follow until discharge.

## 2020-09-02 LAB — HGB BLD-MCNC: 10.8 G/DL (ref 11.7–15.4)

## 2020-09-02 PROCEDURE — 97110 THERAPEUTIC EXERCISES: CPT

## 2020-09-02 PROCEDURE — 74011000250 HC RX REV CODE- 250: Performed by: PHYSICIAN ASSISTANT

## 2020-09-02 PROCEDURE — 85018 HEMOGLOBIN: CPT

## 2020-09-02 PROCEDURE — 36415 COLL VENOUS BLD VENIPUNCTURE: CPT

## 2020-09-02 PROCEDURE — 97116 GAIT TRAINING THERAPY: CPT

## 2020-09-02 PROCEDURE — 65270000029 HC RM PRIVATE

## 2020-09-02 PROCEDURE — 74011250636 HC RX REV CODE- 250/636: Performed by: PHYSICIAN ASSISTANT

## 2020-09-02 PROCEDURE — 74011250637 HC RX REV CODE- 250/637: Performed by: PHYSICIAN ASSISTANT

## 2020-09-02 PROCEDURE — 97535 SELF CARE MNGMENT TRAINING: CPT

## 2020-09-02 PROCEDURE — 74011250637 HC RX REV CODE- 250/637: Performed by: ORTHOPAEDIC SURGERY

## 2020-09-02 RX ORDER — OXYCODONE HYDROCHLORIDE 10 MG/1
10 TABLET ORAL
Qty: 50 TAB | Refills: 0 | Status: SHIPPED | OUTPATIENT
Start: 2020-09-02 | End: 2020-09-05

## 2020-09-02 RX ORDER — ASPIRIN 81 MG/1
81 TABLET ORAL EVERY 12 HOURS
Qty: 60 TAB | Refills: 0 | Status: SHIPPED | OUTPATIENT
Start: 2020-09-02

## 2020-09-02 RX ORDER — PROMETHAZINE HYDROCHLORIDE 25 MG/1
25 TABLET ORAL
Status: DISCONTINUED | OUTPATIENT
Start: 2020-09-02 | End: 2020-09-03 | Stop reason: HOSPADM

## 2020-09-02 RX ADMIN — OXYCODONE 10 MG: 5 TABLET ORAL at 10:34

## 2020-09-02 RX ADMIN — ACETAMINOPHEN 1000 MG: 500 TABLET, FILM COATED ORAL at 12:31

## 2020-09-02 RX ADMIN — OXYCODONE 10 MG: 5 TABLET ORAL at 17:51

## 2020-09-02 RX ADMIN — ACETAMINOPHEN 1000 MG: 500 TABLET, FILM COATED ORAL at 05:00

## 2020-09-02 RX ADMIN — ACETAMINOPHEN 1000 MG: 500 TABLET, FILM COATED ORAL at 17:51

## 2020-09-02 RX ADMIN — OXYCODONE 10 MG: 5 TABLET ORAL at 04:04

## 2020-09-02 RX ADMIN — Medication 1 AMPULE: at 10:34

## 2020-09-02 RX ADMIN — CEFAZOLIN SODIUM 3 G: 100 INJECTION, POWDER, LYOPHILIZED, FOR SOLUTION INTRAVENOUS at 00:26

## 2020-09-02 RX ADMIN — CELECOXIB 200 MG: 200 CAPSULE ORAL at 22:16

## 2020-09-02 RX ADMIN — PROMETHAZINE HYDROCHLORIDE 25 MG: 25 TABLET ORAL at 09:29

## 2020-09-02 RX ADMIN — OXYCODONE 10 MG: 5 TABLET ORAL at 14:24

## 2020-09-02 RX ADMIN — ASPIRIN 81 MG: 81 TABLET, COATED ORAL at 22:16

## 2020-09-02 RX ADMIN — CELECOXIB 200 MG: 200 CAPSULE ORAL at 10:34

## 2020-09-02 RX ADMIN — OXYCODONE 10 MG: 5 TABLET ORAL at 22:16

## 2020-09-02 RX ADMIN — Medication 1 AMPULE: at 22:16

## 2020-09-02 RX ADMIN — DOCUSATE SODIUM 50 MG AND SENNOSIDES 8.6 MG 2 TABLET: 8.6; 5 TABLET, FILM COATED ORAL at 09:00

## 2020-09-02 RX ADMIN — METFORMIN HYDROCHLORIDE 1000 MG: 500 TABLET ORAL at 10:34

## 2020-09-02 RX ADMIN — ASPIRIN 81 MG: 81 TABLET, COATED ORAL at 10:34

## 2020-09-02 RX ADMIN — Medication 5 ML: at 05:20

## 2020-09-02 RX ADMIN — LISINOPRIL 10 MG: 5 TABLET ORAL at 10:35

## 2020-09-02 RX ADMIN — Medication 5 ML: at 22:17

## 2020-09-02 NOTE — PROGRESS NOTES
Problem: Mobility Impaired (Adult and Pediatric)  Goal: *Acute Goals and Plan of Care (Insert Text)  Outcome: Progressing Towards Goal  Note: GOALS (1-4 days):  (1.)Ms. Elio Barrios will move from supine to sit and sit to supine  in bed with STAND BY ASSIST.  (2.)Ms. Elio Barrios will transfer from bed to chair and chair to bed with STAND BY ASSIST using the least restrictive device. (3.)Ms. Paris will ambulate with STAND BY ASSIST for 100 feet with the least restrictive device. (4.)Ms. Paris will ambulate up/down 5 steps with right railing with MINIMAL ASSIST with device as needed. (5.)Ms. Paris will increase left knee ROM to 5°-80°.  ________________________________________________________________________________________________       PHYSICAL THERAPY JOINT CAMP TKA: Daily Note and AM 9/2/2020  INPATIENT: Hospital Day: 2  Payor: Gaby Saha / Plan: [facility] / Product Type: PPO /      NAME/AGE/GENDER: Luciano Lund is a 63 y.o. female   PRIMARY DIAGNOSIS:  Bilateral primary osteoarthritis of knee [M17.0]   Procedure(s) and Anesthesia Type:     * LEFT KNEE ARTHROPLASTY TOTAL ROBOTIC ASSISTED/ FERNANDA/ DAO - Spinal (Left)  ICD-10: Treatment Diagnosis:    · Pain in Left Knee (M25.562)  · Stiffness of Left Knee, Not elsewhere classified (M25.662)  · Difficulty in walking, Not elsewhere classified (R26.2)      ASSESSMENT:     Ms. Elio Barrios presents with decreased functional mobility and gait as well as decreased rom and strength of left LE s/p left tka. She plans to go home with HHPT. Pt. In chair feeling ok. She reports continued nausea. She performed tka exercises in the chair. She ambulated a short distance with RW in the room and then got nauseated and had to sit. Left in chair feeling better. Plans to go home tomorrow. Slower progress due to nausea. This section established at most recent assessment   PROBLEM LIST (Impairments causing functional limitations):  1.  Decreased Strength  2. Decreased ADL/Functional Activities  3. Decreased Transfer Abilities  4. Decreased Ambulation Ability/Technique  5. Decreased Balance  6. Increased Pain  7. Decreased Activity Tolerance  8. Decreased Flexibility/Joint Mobility  9. Decreased Bailey with Home Exercise Program   INTERVENTIONS PLANNED: (Benefits and precautions of physical therapy have been discussed with the patient.)  1. Bed Mobility  2. Cold  3. Gait Training  4. Home Exercise Program (HEP)  5. Range of Motion (ROM)  6. Therapeutic Activites  7. Therapeutic Exercise/Strengthening  8. Transfer Training     TREATMENT PLAN: Frequency/Duration: Follow patient BID for duration of hospital stay to address above goals. Rehabilitation Potential For Stated Goals: Good     RECOMMENDED REHABILITATION/EQUIPMENT: (at time of discharge pending progress): Continue Skilled Therapy and Home Health: Physical Therapy. HISTORY:   History of Present Injury/Illness (Reason for Referral):  S/p left tka  Past Medical History/Comorbidities:   Ms. Damion Bray  has a past medical history of Hypertension, SAWYER (obstructive sleep apnea), Osteoarthritis, and Type 2 diabetes mellitus ([facility] [address] Ms. Damion Bray  has a past surgical history that includes hx tonsillectomy and hx lap cholecystectomy.   Social History/Living Environment:   Home Environment: Private residence  # Steps to Enter: 5  One/Two Story Residence: Two story, live on 1st floor  Living Alone: No  Support Systems: Parent  Patient Expects to be Discharged to[de-identified] Private residence  Current DME Used/Available at Home: None  Prior Level of Function/Work/Activity:  independent   Number of Personal Factors/Comorbidities that affect the Plan of Care: 1-2: MODERATE COMPLEXITY   EXAMINATION:   Most Recent Physical Functioning:                            Bed Mobility  Supine to Sit: Stand-by assistance    Transfers  Sit to Stand: Contact guard assistance  Stand to Sit: Contact guard assistance  Bed to Chair: Contact guard assistance    Balance  Sitting: Intact  Standing: With support              Weight Bearing Status  Left Side Weight Bearing: As tolerated  Distance (ft): 10 Feet (ft)  Ambulation - Level of Assistance: Contact guard assistance  Assistive Device: Walker, rolling  Speed/Lizzie: Delayed  Step Length: Left shortened;Right shortened  Stance: Left decreased  Gait Abnormalities: Antalgic  Interventions: Safety awareness training;Verbal cues     Braces/Orthotics:     Left Knee Cold  Type: Cryocuff      Body Structures Involved:  1. Bones  2. Joints  3. Muscles  4. Ligaments Body Functions Affected:  1. Movement Related Activities and Participation Affected:  1. Mobility   Number of elements that affect the Plan of Care: 3: MODERATE COMPLEXITY   CLINICAL PRESENTATION:   Presentation: Stable and uncomplicated: LOW COMPLEXITY   CLINICAL DECISION MAKIN[address] [place] [id number] AM-PAC 6 Clicks   Basic Mobility Inpatient Short Form  How much difficulty does the patient currently have. .. Unable A Lot A Little None   1. Turning over in bed (including adjusting bedclothes, sheets and blankets)? [] 1   [] 2   [x] 3   [] 4   2. Sitting down on and standing up from a chair with arms ( e.g., wheelchair, bedside commode, etc.)   [] 1   [] 2   [x] 3   [] 4   3. Moving from lying on back to sitting on the side of the bed? [] 1   [] 2   [x] 3   [] 4   How much help from another person does the patient currently need. .. Total A Lot A Little None   4. Moving to and from a bed to a chair (including a wheelchair)? [] 1   [] 2   [x] 3   [] 4   5. Need to walk in hospital room? [] 1   [] 2   [x] 3   [] 4   6. Climbing 3-5 steps with a railing? [] 1   [x] 2   [] 3   [] 4   © , Trustees of [address] [place] [address], under license to [employer].  All rights reserved     Score:  Initial: 17 Most Recent: X (Date: -- )    Interpretation of Tool:  Represents activities that are increasingly more difficult (i.e. Bed mobility, Transfers, Gait). Medical Necessity:     · Patient is expected to demonstrate progress in   · strength, range of motion, and balance  ·  to   · decrease assistance required with exercises and functional mobility  · . Reason for Services/Other Comments:  · Patient   · continues to require present interventions due to patient's inability to perform exercises and functional mobility independently  · . Use of outcome tool(s) and clinical judgement create a POC that gives a: Clear prediction of patient's progress: LOW COMPLEXITY            TREATMENT:   (In addition to Assessment/Re-Assessment sessions the following treatments were rendered)     Pre-treatment Symptoms/Complaints:  nausea, knee pain  Pain Initial:      Post Session:  mild     Therapeutic Exercise: (15 Minutes):  Exercises per grid below to improve mobility and strength. Required minimal visual, verbal, and manual cues to promote proper body alignment, promote proper body posture, and promote proper body mechanics. Progressed range and repetitions as indicated. Gait Training (10 Minutes):  Gait training to improve and/or restore physical functioning as related to mobility, strength and balance. Ambulated 10 Feet (ft) with Contact guard assistance using a Walker, rolling and minimal Safety awareness training;Verbal cues related to their stance phase to promote proper body alignment, promote proper body posture and promote proper body mechanics.       Date:  9/1/20 Date:  9/2 Date:     ACTIVITY/EXERCISE AM PM AM PM AM PM   GROUP THERAPY  []  []  []  []  []  []   Ankle Pumps  10a 10a      Quad Sets  10a 10a      Gluteal Sets  10a 10a      Hip ABd/ADduction  10a 10a      Straight Leg Raises   10aa      Knee Slides  10a 10aa      Short Arc Quads         Long Arc Quads         Chair Slides                  B = bilateral; AA = active assistive; A = active; P = passive      Treatment/Session Assessment:     Response to Treatment:   nausea limiting activity. Education:  [x] Home Exercises  [x] Fall Precautions  []  [] D/C Instruction Review  [x] Knee Prosthesis Review  [x] Walker Management/Safety [] Adaptive Equipment as Needed       Interdisciplinary Collaboration:   o Registered Nurse    After treatment position/precautions:   o Up in chair  o Bed/Chair-wheels locked  o Bed in low position  o Call light within reach    Compliance with Program/Exercises: Will assess as treatment progresses. Recommendations/Intent for next treatment session:  Treatment next visit will focus on increasing Ms. Paris's independence with bed mobility, transfers, gait training, strength/ROM exercises, modalities for pain, and patient education.       Total Treatment Duration:  PT Patient Time In/Time Out  Time In: 0930  Time Out: Cristo Briscoe 17, PT

## 2020-09-02 NOTE — PROGRESS NOTES
2020         Post Op day: 1 Day Post-Op   Admit Diagnosis: Bilateral primary osteoarthritis of knee [M17.0]  Arthritis of left knee [M17.12]  LAB:    Recent Results (from the past 24 hour(s))   GLUCOSE, POC    Collection Time: 20 10:33 AM   Result Value Ref Range    Glucose (POC) 149 (H) 65 - 100 mg/dL   HEMOGLOBIN    Collection Time: 20  7:35 PM   Result Value Ref Range    HGB 11.2 (L) 11.7 - 15.4 g/dL   HEMOGLOBIN    Collection Time: 20  3:29 AM   Result Value Ref Range    HGB 10.8 (L) 11.7 - 15.4 g/dL     Vital Signs:    Patient Vitals for the past 8 hrs:   BP Temp Pulse Resp SpO2   20 0732 121/67 98.1 °F (36.7 °C) 68 16 92 %   20 0447 124/65 98.2 °F (36.8 °C) 72 16 94 %   20 0026 140/74 98.6 °F (37 °C) 71 16 96 %     Temp (24hrs), Av.2 °F (36.8 °C), Min:97.7 °F (36.5 °C), Max:99 °F (37.2 °C)    Pain Control:   Pain Assessment  Pain Scale 1: Numeric (0 - 10)  Pain Intensity 1: 4  Pain Onset 1: YRS  Pain Location 1: Knee  Pain Orientation 1: Left  Pain Description 1: Constant, Burning, Aching  Pain Intervention(s) 1: Medication (see MAR)  Subjective: Doing well, normal recovery experienced. Objective:  No Acute Distress, Alert and Oriented, Neurovascular exam is normal       Assessment:   Patient Active Problem List   Diagnosis Code    Arthritis of left knee M17.12       Status Post Procedure(s) (LRB):  LEFT KNEE ARTHROPLASTY TOTAL ROBOTIC ASSISTED/ FERNANDA/ DAO (Left)        Plan: Continue Physical Therapy, discharge home anticipated.    Signed By: lE Moreno MD

## 2020-09-02 NOTE — PROGRESS NOTES
Problem: Self Care Deficits Care Plan (Adult)  Goal: *Acute Goals and Plan of Care (Insert Text)  Description: GOALS:   DISCHARGE GOALS (in preparation for going home/rehab):  3 days  1. Ms. Sarah Jefferson will perform one lower body dressing activity with minimal assistance required to demonstrate improved functional mobility and safety. 2.  Ms. Sarah Jefferson will perform one lower body bathing activity with minimal assistance required to demonstrate improved functional mobility and safety. 3.  Ms. Sarah Jefferson will perform toileting/toilet transfer with contact guard assistance to demonstrate improved functional mobility and safety. Met 9/2/2020  4. Ms. Sarah Jefferson will perform shower transfer with contact guard assistance to demonstrate improved functional mobility and safety. Outcome: Progressing Towards Goal     JOINT CAMP OCCUPATIONAL THERAPY TKA: Daily Note and AM 9/2/2020  INPATIENT: Hospital Day: 2  Payor[place] BLUE CR[place] / [place] OF STATE / Product Type: PPO /      NAME/AGE/GENDER: Gilson Gilliam is a 63 y.o. female   PRIMARY DIAGNOSIS:  Bilateral primary osteoarthritis of knee [M17.0]   Procedure(s) and Anesthesia Type:     * LEFT KNEE ARTHROPLASTY TOTAL ROBOTIC ASSISTED/ FERNANDA/ DAO - Spinal (Left)  ICD-10: Treatment Diagnosis:    · Pain in Left Knee (M25.562)  · Stiffness of Left Knee, Not elsewhere classified (U66.188)      ASSESSMENT:     Ms. Sarah Jefferson is s/p L TKA and presents with decreased weight bearing on L LE and decreased independence with functional mobility and activities of daily living as compared to baseline level of function and safety. Patient would benefit from skilled Occupational Therapy to maximize independence and safety with self-care task and functional mobility. Pt would also benefit from education on adaptive equipment and safety precautions in preparation for going home.     Pt completed dressing and functional transfers this pm.     9-2-20 Pt completed bed mobility, toileting, grooming and transfers this am. See chart below for assist level. Pt nauseous and wants to do shower later this am. Continue OT POC    This section established at most recent assessment   PROBLEM LIST (Impairments causing functional limitations):  1. Decreased Strength  2. Decreased ADL/Functional Activities  3. Decreased Transfer Abilities  4. Increased Pain  5. Increased Fatigue  6. Decreased Flexibility/Joint Mobility  7. Decreased Knowledge of Precautions   INTERVENTIONS PLANNED: (Benefits and precautions of occupational therapy have been discussed with the patient.)  1. Activities of daily living training  2. Adaptive equipment training  3. Balance training  4. Clothing management  5. Donning&doffing training  6. Theraputic activity     TREATMENT PLAN: Frequency/Duration: Follow patient qd to address above goals. Rehabilitation Potential For Stated Goals: Good     RECOMMENDED REHABILITATION/EQUIPMENT: (at time of discharge pending progress): Continue Skilled Therapy and Home Health: Physical Therapy. OCCUPATIONAL PROFILE AND HISTORY:   History of Present Injury/Illness (Reason for Referral): Pt presents this date s/p (L) TKA. Past Medical History/Comorbidities:   Ms. Sarah Jefferson  has a past medical history of Hypertension, SAWYER (obstructive sleep apnea), Osteoarthritis, and Type 2 diabetes mellitus ([facility] [address] Ms. Sarah Jefferson  has a past surgical history that includes hx tonsillectomy and hx lap cholecystectomy.   Social History/Living Environment:   Home Environment: Private residence  # Steps to Enter: 5  One/Two Story Residence: Two story, live on 1st floor  Living Alone: No  Support Systems: Parent  Patient Expects to be Discharged to[de-identified] Private residence  Current DME Used/Available at Home: None  Prior Level of Function/Work/Activity:  independent     Number of Personal Factors/Comorbidities that affect the Plan of Care: Brief history (0):  LOW COMPLEXITY   ASSESSMENT OF OCCUPATIONAL PERFORMANCE[de-identified]   Most Recent Physical Functioning:                                  Mental Status  Neurologic State: Alert  Cognition: Follows commands  Perception: Appears intact  Perseveration: No perseveration noted  Safety/Judgement: Fall prevention                Basic ADLs (From Assessment) Complex ADLs (From Assessment)   Basic ADL  Feeding: Setup  Oral Facial Hygiene/Grooming: Setup  Bathing: Minimum assistance  Upper Body Dressing: Setup  Lower Body Dressing: Moderate assistance  Toileting: Minimum assistance     Grooming/Bathing/Dressing Activities of Daily Living   Grooming  Grooming Assistance: Set-up  Position Performed: Standing  Washing Hands: Set-up Cognitive Retraining  Safety/Judgement: Fall prevention     Feeding  Feeding Assistance: Set-up     Toileting  Toileting Assistance: Set-up     Functional Transfers  Bathroom Mobility: Contact guard assistance  Toilet Transfer : Contact guard assistance  Adaptive Equipment: Bedside commode;Walker (comment)     Bed/Mat Mobility  Supine to Sit: Stand-by assistance  Sit to Stand: Contact guard assistance  Stand to Sit: Contact guard assistance  Bed to Chair: Contact guard assistance         Physical Skills Involved:  1. Balance  2. Strength  3. Activity Tolerance Cognitive Skills Affected (resulting in the inability to perform in a timely and safe manner): 1. none Psychosocial Skills Affected:  1. none   Number of elements that affect the Plan of Care: 1-3:  LOW COMPLEXITY   CLINICAL DECISION MAKIN [facility] [address] [address] AM-PAC 6 Clicks   Daily Activity Inpatient Short Form  How much help from another person does the patient currently need. .. Total A Lot A Little None   1. Putting on and taking off regular lower body clothing? [] 1   [x] 2   [] 3   [] 4   2. Bathing (including washing, rinsing, drying)? [] 1   [] 2   [x] 3   [] 4   3. Toileting, which includes using toilet, bedpan or urinal?   [] 1   [] 2   [x] 3   [] 4   4.   Putting on and taking off regular upper body clothing? [] 1   [] 2   [] 3   [x] 4   5. Taking care of personal grooming such as brushing teeth? [] 1   [] 2   [] 3   [x] 4   6. Eating meals? [] 1   [] 2   [] 3   [x] 4   © 2007, Trustees of [address] [place] [address] [address] [id number], under license to [employer]. All rights reserved     Score:  Initial: 20 Most Recent: X (Date: -- )    Interpretation of Tool:  Represents activities that are increasingly more difficult (i.e. Bed mobility, Transfers, Gait). Medical Necessity:     · Patient is expected to demonstrate progress in   · strength, balance, coordination, and functional technique  ·  to   · increase independence with self care and functional mobility  · . Reason for Services/Other Comments:  · Patient continues to require skilled intervention due to   · Decrease self care and functional mobility   · . Use of outcome tool(s) and clinical judgement create a POC that gives a: LOW COMPLEXITY            TREATMENT:   (In addition to Assessment/Re-Assessment sessions the following treatments were rendered)     Pre-treatment Symptoms/Complaints: tolerated toileting   Pain: Initial:   Pain Intensity 1: 0  Post Session:  0     Self Care: (23): Procedure(s) (per grid) utilized to improve and/or restore self-care/home management as related to toileting, grooming and transfers. Required minimal verbal and   cueing to facilitate activities of daily living skills and compensatory activities    Treatment/Session Assessment:     Response to Treatment:  tolerated well.     Education:  [] Home Exercises  [x] Fall Precautions  [] Hip Precautions [] Going Home Video  [x] Knee/Hip Prosthesis Review  [x] Walker Management/Safety [x] Adaptive Equipment as Needed       Interdisciplinary Collaboration:   o Occupational Therapist  o Registered Nurse    After treatment position/precautions:   o Up in chair  o Bed/Chair-wheels locked  o Call light within reach  o RN notified  o LEs reclined     Compliance with Program/Exercises: Compliant all of the time. Recommendations/Intent for next treatment session:  Treatment next visit will focus on increasing Ms. Paris's independence with bed mobility, transfers, self care, functional mobility, modalities for pain, and patient education.       Total Treatment Duration:  OT Patient Time In/Time Out  Time In: 0881  Time Out: 1177 Vik Contreras OT

## 2020-09-02 NOTE — PROGRESS NOTES
Shift assessment completed. Alert and oriented x4. Left knee Aquacel clean, dry, and intact. Sensation present, plantar/dorsiflexion strong in bilateral extremities. Lung sounds clear bilaterally. No abnormal heart sounds. Pt complains of nausea. Given PRN nausea medication. Pt tolerated well. Will continue to monitor. Bed locked, in lowest position, call light within reach.

## 2020-09-02 NOTE — PROGRESS NOTES
Problem: Mobility Impaired (Adult and Pediatric)  Goal: *Acute Goals and Plan of Care (Insert Text)  Outcome: Progressing Towards Goal  Note: GOALS (1-4 days):  (1.)Ms. Harley Ha will move from supine to sit and sit to supine  in bed with STAND BY ASSIST.  (2.)Ms. Harley Ha will transfer from bed to chair and chair to bed with STAND BY ASSIST using the least restrictive device. (3.)Ms. Paris will ambulate with STAND BY ASSIST for 100 feet with the least restrictive device. (4.)Ms. Paris will ambulate up/down 5 steps with right railing with MINIMAL ASSIST with device as needed. (5.)Ms. Paris will increase left knee ROM to 5°-80°.  ________________________________________________________________________________________________       PHYSICAL THERAPY JOINT CAMP TKA: Daily Note and PM 9/2/2020  INPATIENT: Hospital Day: 2  Pay[place]UE CR[place] / [place] [facility] / Product Type: PPO /      NAME/AGE/GENDER: Seamus Class is a 63 y.o. female   PRIMARY DIAGNOSIS:  Bilateral primary osteoarthritis of knee [M17.0]   Procedure(s) and Anesthesia Type:     * LEFT KNEE ARTHROPLASTY TOTAL ROBOTIC ASSISTED/ FERNANDA/ DAO - Spinal (Left)  ICD-10: Treatment Diagnosis:    · Pain in Left Knee (M25.562)  · Stiffness of Left Knee, Not elsewhere classified (M25.662)  · Difficulty in walking, Not elsewhere classified (R26.2)      ASSESSMENT:     Ms. Harley Ha presents with decreased functional mobility and gait as well as decreased rom and strength of left LE s/p left tka. She plans to go home with HHPT. Pt. In chair feeling better. She ambulated in the [place] with RW working on step through gait. She did some steps. She did tka exercises with cues and assistance. Pain limited rom. She did not have any nausea or vomiting. She plans to stay until tomorrow. This section established at most recent assessment   PROBLEM LIST (Impairments causing functional limitations):  1. Decreased Strength  2.  Decreased ADL/Functional Activities  3. Decreased Transfer Abilities  4. Decreased Ambulation Ability/Technique  5. Decreased Balance  6. Increased Pain  7. Decreased Activity Tolerance  8. Decreased Flexibility/Joint Mobility  9. Decreased [place] with Home Exercise Program   INTERVENTIONS PLANNED: (Benefits and precautions of physical therapy have been discussed with the patient.)  1. Bed Mobility  2. Cold  3. Gait Training  4. Home Exercise Program (HEP)  5. Range of Motion (ROM)  6. Therapeutic Activites  7. Therapeutic Exercise/Strengthening  8. Transfer Training     TREATMENT PLAN: Frequency/Duration: Follow patient BID for duration of hospital stay to address above goals. Rehabilitation Potential For Stated Goals: Good     RECOMMENDED REHABILITATION/EQUIPMENT: (at time of discharge pending progress): Continue Skilled Therapy and Home Health: Physical Therapy. HISTORY:   History of Present Injury/Illness (Reason for Referral):  S/p left tka  Past Medical History/Comorbidities:   Ms. Ebenezer Oquendo  has a past medical history of Hypertension, SAWYER (obstructive sleep apnea), Osteoarthritis, and Type 2 diabetes mellitus ([facility] [address] Ms. Ebenezer Oquendo  has a past surgical history that includes hx tonsillectomy and hx lap cholecystectomy.   Social History/Living Environment:   Home Environment: Private residence  # Steps to Enter: 5  One/Two Story Residence: Two story, live on 1st floor  Living Alone: No  Support Systems: Parent  Patient Expects to be Discharged to[de-identified] Private residence  Current DME Used/Available at Home: None  Prior Level of Function/Work/Activity:  independent   Number of Personal Factors/Comorbidities that affect the Plan of Care: 1-2: MODERATE COMPLEXITY   EXAMINATION:   Most Recent Physical Functioning:                 LLE AROM  L Knee Flexion: 70  L Knee Extension: 9          Bed Mobility  Supine to Sit: Stand-by assistance    Transfers  Sit to Stand: Stand-by assistance  Stand to Sit: Stand-by assistance  Bed to Chair: Contact guard assistance    Balance  Sitting: Intact  Standing: With support              Weight Bearing Status  Left Side Weight Bearing: As tolerated  Distance (ft): 200 Feet (ft)  Ambulation - Level of Assistance: Stand-by assistance  Assistive Device: Walker, rolling  Speed/Lizzie: Delayed  Step Length: Right shortened;Left shortened  Stance: Left decreased  Gait Abnormalities: Antalgic  Number of Stairs Trained: 3(also one step with walker)  Stairs - Level of Assistance: Contact guard assistance  Rail Use: Both  Interventions: Safety awareness training;Verbal cues     Braces/Orthotics:     Left Knee Cold  Type: Cryocuff      Body Structures Involved:  1. Bones  2. Joints  3. Muscles  4. Ligaments Body Functions Affected:  1. Movement Related Activities and Participation Affected:  1. Mobility   Number of elements that affect the Plan of Care: 3: MODERATE COMPLEXITY   CLINICAL PRESENTATION:   Presentation: Stable and uncomplicated: LOW COMPLEXITY   CLINICAL DECISION MAKIN[address] [place] [address] AM-PAC 6 Clicks   Basic Mobility Inpatient Short Form  How much difficulty does the patient currently have. .. Unable A Lot A Little None   1. Turning over in bed (including adjusting bedclothes, sheets and blankets)? [] 1   [] 2   [x] 3   [] 4   2. Sitting down on and standing up from a chair with arms ( e.g., wheelchair, bedside commode, etc.)   [] 1   [] 2   [x] 3   [] 4   3. Moving from lying on back to sitting on the side of the bed? [] 1   [] 2   [x] 3   [] 4   How much help from another person does the patient currently need. .. Total A Lot A Little None   4. Moving to and from a bed to a chair (including a wheelchair)? [] 1   [] 2   [x] 3   [] 4   5. Need to walk in hospital room? [] 1   [] 2   [x] 3   [] 4   6. Climbing 3-5 steps with a railing? [] 1   [x] 2   [] 3   [] 4   © , Trustees of [address] [place], [place] [id number], under license to [employer].  All rights reserved Score:  Initial: 17 Most Recent: X (Date: -- )    Interpretation of Tool:  Represents activities that are increasingly more difficult (i.e. Bed mobility, Transfers, Gait). Medical Necessity:     · Patient is expected to demonstrate progress in   · strength, range of motion, and balance  ·  to   · decrease assistance required with exercises and functional mobility  · . Reason for Services/Other Comments:  · Patient   · continues to require present interventions due to patient's inability to perform exercises and functional mobility independently  · . Use of outcome tool(s) and clinical judgement create a POC that gives a: Clear prediction of patient's progress: LOW COMPLEXITY            TREATMENT:   (In addition to Assessment/Re-Assessment sessions the following treatments were rendered)     Pre-treatment Symptoms/Complaints:   knee pain  Pain Initial:      Post Session:  8 with gait     Therapeutic Exercise: (15 Minutes):  Exercises per grid below to improve mobility and strength. Required minimal visual, verbal, and manual cues to promote proper body alignment, promote proper body posture, and promote proper body mechanics. Progressed range and repetitions as indicated. Gait Training (15 Minutes):  Gait training to improve and/or restore physical functioning as related to mobility, strength and balance. Ambulated 200 Feet (ft) with Stand-by assistance using a Walker, rolling and minimal Safety awareness training;Verbal cues related to their stance phase to promote proper body alignment, promote proper body posture and promote proper body mechanics.       Date:  9/1/20 Date:  9/2 Date:     ACTIVITY/EXERCISE AM PM AM PM AM PM   GROUP THERAPY  []  []  []  []  []  []   Ankle Pumps  10a 10a 15a     Quad Sets  10a 10a 15a     Gluteal Sets  10a 10a 15a     Hip ABd/ADduction  10a 10a 15a     Straight Leg Raises   10aa 15aa     Knee Slides  10a 10aa 15aa     Short Arc Quads    15aa     Long Arc Quads Chair Slides    15a              B = bilateral; AA = active assistive; A = active; P = passive      Treatment/Session Assessment:     Response to Treatment:   Nausea better, more mobile this afternoon    Education:  [x] Home Exercises  [x] Fall Precautions  []  [x] D/C Instruction Review  [x] Knee Prosthesis Review  [x] Walker Management/Safety [] Adaptive Equipment as Needed       Interdisciplinary Collaboration:   o Registered Nurse    After treatment position/precautions:   o Up in chair  o Bed/Chair-wheels locked  o Bed in low position  o Call light within reach    Compliance with Program/Exercises: Will assess as treatment progresses. Recommendations/Intent for next treatment session:  Treatment next visit will focus on increasing Ms. Paris's independence with bed mobility, transfers, gait training, strength/ROM exercises, modalities for pain, and patient education.       Total Treatment Duration:  PT Patient Time In/Time Out  Time In: 1355  Time Out: 223 [facility]

## 2020-09-02 NOTE — PROGRESS NOTES
Shift assessment completed via flow sheet. Pt a/o x 4, respirations are even and unlabored. Lung sounds clear upon auscultation. Active bowel sounds in all 4 quadrants. Aqua cell dressing to LLE CDI, IV site CDI. No signs of distress or c/o pain at this time. Safety measures in place. Instructed pt to call for assistance. Will continue to monitor.

## 2020-09-02 NOTE — PROGRESS NOTES
Problem: Diabetes Self-Management  Goal: *Disease process and treatment process  Description: Define diabetes and identify own type of diabetes; list 3 options for treating diabetes. Outcome: Progressing Towards Goal  Goal: *Incorporating nutritional management into lifestyle  Description: Describe effect of type, amount and timing of food on blood glucose; list 3 methods for planning meals. Outcome: Progressing Towards Goal  Goal: *Incorporating physical activity into lifestyle  Description: State effect of exercise on blood glucose levels. Outcome: Progressing Towards Goal  Goal: *Developing strategies to promote health/change behavior  Description: Define the ABC's of diabetes; identify appropriate screenings, schedule and personal plan for screenings. Outcome: Progressing Towards Goal  Goal: *Using medications safely  Description: State effect of diabetes medications on diabetes; name diabetes medication taking, action and side effects. Outcome: Progressing Towards Goal  Goal: *Monitoring blood glucose, interpreting and using results  Description: Identify recommended blood glucose targets  and personal targets. Outcome: Progressing Towards Goal  Goal: *Prevention, detection, treatment of acute complications  Description: List symptoms of hyper- and hypoglycemia; describe how to treat low blood sugar and actions for lowering  high blood glucose level. Outcome: Progressing Towards Goal  Goal: *Prevention, detection and treatment of chronic complications  Description: Define the natural course of diabetes and describe the relationship of blood glucose levels to long term complications of diabetes.   Outcome: Progressing Towards Goal  Goal: *Developing strategies to address psychosocial issues  Description: Describe feelings about living with diabetes; identify support needed and support network  Outcome: Progressing Towards Goal  Goal: *Insulin pump training  Outcome: Progressing Towards Goal  Goal: *Sick day guidelines  Outcome: Progressing Towards Goal  Goal: *Patient Specific Goal (EDIT GOAL, INSERT TEXT)  Outcome: Progressing Towards Goal     Problem: Patient Education: Go to Patient Education Activity  Goal: Patient/Family Education  Outcome: Progressing Towards Goal     Problem: Falls - Risk of  Goal: *Absence of Falls  Description: Document Sal Armendariz Fall Risk and appropriate interventions in the flowsheet.   Outcome: Progressing Towards Goal  Note: Fall Risk Interventions:  Mobility Interventions: Patient to call before getting OOB, OT consult for ADLs, PT Consult for mobility concerns, PT Consult for assist device competence, Strengthening exercises (ROM-active/passive), Utilize walker, cane, or other assistive device         Medication Interventions: Patient to call before getting OOB, Teach patient to arise slowly    Elimination Interventions: Call light in reach, Patient to call for help with toileting needs              Problem: Patient Education: Go to Patient Education Activity  Goal: Patient/Family Education  Outcome: Progressing Towards Goal     Problem: Patient Education: Go to Patient Education Activity  Goal: Patient/Family Education  Outcome: Progressing Towards Goal     Problem: Patient Education: Go to Patient Education Activity  Goal: Patient/Family Education  Outcome: Progressing Towards Goal

## 2020-09-02 NOTE — PROGRESS NOTES
Problem: Self Care Deficits Care Plan (Adult)  Goal: *Acute Goals and Plan of Care (Insert Text)  Description: GOALS:   DISCHARGE GOALS (in preparation for going home/rehab):  3 days all goals met 9/2/2020  1. Ms. Chanda Wesley will perform one lower body dressing activity with minimal assistance required to demonstrate improved functional mobility and safety. 2.  Ms. Chanda Wesley will perform one lower body bathing activity with minimal assistance required to demonstrate improved functional mobility and safety. 3.  Ms. Chanda Wesley will perform toileting/toilet transfer with contact guard assistance to demonstrate improved functional mobility and safety. Met 9/2/2020  4. Ms. Chanda Wesley will perform shower transfer with contact guard assistance to demonstrate improved functional mobility and safety. Outcome: Progressing Towards Goal     JOINT CAMP OCCUPATIONAL THERAPY TKA: Daily Note and AM 9/2/2020  INPATIENT: Hospital Day: 2  Payor: Nik Shaver / Plan: [place] [employer] [facility] / Product Type: PPO /      NAME/AGE/GENDER: Samanta Briggs is a 63 y.o. female   PRIMARY DIAGNOSIS:  Bilateral primary osteoarthritis of knee [M17.0]   Procedure(s) and Anesthesia Type:     * LEFT KNEE ARTHROPLASTY TOTAL ROBOTIC ASSISTED/ FERNANDA/ DAO - Spinal (Left)  ICD-10: Treatment Diagnosis:    · Pain in Left Knee (M25.562)  · Stiffness of Left Knee, Not elsewhere classified (M19.936)      ASSESSMENT:     Ms. Chanda Paris is s/p L TKA and presents with decreased weight bearing on L LE and decreased independence with functional mobility and activities of daily living. Patient completed shower and dressing as charter below in ADL grid and is ambulating with rolling walker and contact guard assist.  Patient has met 4/4 goals and plans to return home with good family support. Family able to provide patient with appropriate level of assistance at this time.   OT reviewed safety precautions throughout session and therapy schedule for the remainder of today. Patient instructed to call for assistance when needing to get up from recliner and all needs in reach. Patient verbalized understanding of call light. This section established at most recent assessment   PROBLEM LIST (Impairments causing functional limitations):  1. Decreased Strength  2. Decreased ADL/Functional Activities  3. Decreased Transfer Abilities  4. Increased Pain  5. Increased Fatigue  6. Decreased Flexibility/Joint Mobility  7. Decreased Knowledge of Precautions   INTERVENTIONS PLANNED: (Benefits and precautions of occupational therapy have been discussed with the patient.)  1. Activities of daily living training  2. Adaptive equipment training  3. Balance training  4. Clothing management  5. Donning&doffing training  6. Theraputic activity     TREATMENT PLAN: Frequency/Duration: Follow patient qd to address above goals. Rehabilitation Potential For Stated Goals: Good     RECOMMENDED REHABILITATION/EQUIPMENT: (at time of discharge pending progress): Continue Skilled Therapy and Home Health: Physical Therapy. OCCUPATIONAL PROFILE AND HISTORY:   History of Present Injury/Illness (Reason for Referral): Pt presents this date s/p (L) TKA. Past Medical History/Comorbidities:   Ms. Tiffany Limon  has a past medical history of Hypertension, SAWYER (obstructive sleep apnea), Osteoarthritis, and Type 2 diabetes mellitus ([facility] [address] Ms. Tiffany Limon  has a past surgical history that includes hx tonsillectomy and hx lap cholecystectomy.   Social History/Living Environment:   Home Environment: Private residence  # Steps to Enter: 5  One/Two Story Residence: Two story, live on 1st floor  Living Alone: No  Support Systems: Parent  Patient Expects to be Discharged to[de-identified] Private residence  Current DME Used/Available at Home: None  Prior Level of Function/Work/Activity:  independent     Number of Personal Factors/Comorbidities that affect the Plan of Care: Brief history (0):  LOW COMPLEXITY ASSESSMENT OF OCCUPATIONAL PERFORMANCE[de-identified]   Most Recent Physical Functioning:   Balance  Sitting: Intact  Standing: With support                              Mental Status  Neurologic State: Alert  Orientation Level: Oriented X4  Cognition: Follows commands  Perception: Appears intact  Perseveration: No perseveration noted  Safety/Judgement: Fall prevention                Basic ADLs (From Assessment) Complex ADLs (From Assessment)   Basic ADL  Feeding: Setup  Oral Facial Hygiene/Grooming: Setup  Bathing: Minimum assistance  Type of Bath: Chlorhexidine (CHG), Full, Shower  Upper Body Dressing: Setup  Lower Body Dressing: Moderate assistance  Toileting: Minimum assistance     Grooming/Bathing/Dressing Activities of Daily Living   Grooming  Grooming Assistance: Set-up  Position Performed: Standing  Washing Hands: Set-up  Brushing/Combing Hair: Set-up Cognitive Retraining  Safety/Judgement: Fall prevention   Upper Body Bathing  Bathing Assistance: Set-up  Position Performed: Standing  Cues: Verbal cues provided  Adaptive Equipment: Grab bar Feeding  Feeding Assistance: Set-up   Lower Body Bathing  Bathing Assistance: Contact guard assistance  Perineal  : Set-up  Position Performed: Standing  Cues: Verbal cues provided  Adaptive Equipment: Grab bar  Lower Body : Contact guard assistance  Position Performed: Standing  Cues: Verbal cues provided  Adaptive Equipment: Grab bar Toileting  Toileting Assistance: Set-up  Bladder Hygiene: Set-up   Upper Body Dressing Assistance  Dressing Assistance: Set-up  Bra: Set-up  Pullover Shirt: Set-up Functional Transfers  Bathroom Mobility: Contact guard assistance  Toilet Transfer : Contact guard assistance  Shower Transfer: Contact guard assistance  Adaptive Equipment: Bedside commode;Grab bars; Walker (comment)   Lower Body Dressing Assistance  Dressing Assistance: Minimum assistance  Underpants: Minimum assistance  Pants With Elastic Waist: Minimum assistance  Shoes with Velcro: Compensatory technique training  Adaptive Equipment Used: Grab bar;Walker Bed/Mat Mobility  Supine to Sit: Stand-by assistance  Sit to Stand: Contact guard assistance  Stand to Sit: Contact guard assistance  Bed to Chair: Contact guard assistance         Physical Skills Involved:  1. Balance  2. Strength  3. Activity Tolerance Cognitive Skills Affected (resulting in the inability to perform in a timely and safe manner): 1. none Psychosocial Skills Affected:  1. none   Number of elements that affect the Plan of Care: 1-3:  LOW COMPLEXITY   CLINICAL DECISION MAKING:   [facility] AM-PAC 6 Clicks   Daily Activity Inpatient Short Form  How much help from another person does the patient currently need. .. Total A Lot A Little None   1. Putting on and taking off regular lower body clothing? [] 1   [] 2   [x] 3   [] 4   2. Bathing (including washing, rinsing, drying)? [] 1   [] 2   [] 3   [x] 4   3. Toileting, which includes using toilet, bedpan or urinal?   [] 1   [] 2   [] 3   [x] 4   4. Putting on and taking off regular upper body clothing? [] 1   [] 2   [] 3   [x] 4   5. Taking care of personal grooming such as brushing teeth? [] 1   [] 2   [] 3   [x] 4   6. Eating meals? [] 1   [] 2   [] 3   [x] 4   © 2007, Trustees of [facility], under license to [employer]. All rights reserved     Score:  Initial: 20 Most Recent: 23 (Date: 9-2-20 )    Interpretation of Tool:  Represents activities that are increasingly more difficult (i.e. Bed mobility, Transfers, Gait). Medical Necessity:     · Patient is expected to demonstrate progress in   · strength, balance, coordination, and functional technique  ·  to   · increase independence with self care and functional mobility  · . Reason for Services/Other Comments:  · Patient continues to require skilled intervention due to   · Decrease self care and functional mobility   · .    Use of outcome tool(s) and clinical judgement create a POC that gives a: LOW COMPLEXITY            TREATMENT:   (In addition to Assessment/Re-Assessment sessions the following treatments were rendered)     Pre-treatment Symptoms/Complaints: tolerated shower  Pain: Initial:   Pain Intensity 1: 4  Pain Location 1: Knee  Pain Orientation 1: Left  Pain Intervention(s) 1: Repositioned  Post Session:  4     Self Care: (40): Procedure(s) (per grid) utilized to improve and/or restore self-care/home management as related to dressing, bathing, toileting and grooming. Required minimal verbal and   cueing to facilitate activities of daily living skills and compensatory activities    Treatment/Session Assessment:     Response to Treatment:  tolerated well. Education:  [] Home Exercises  [x] Fall Precautions  [] Hip Precautions [] Going Home Video  [x] Knee/Hip Prosthesis Review  [x] Walker Management/Safety [x] Adaptive Equipment as Needed       Interdisciplinary Collaboration:   o Occupational Therapist  o Registered Nurse    After treatment position/precautions:   o Up in chair  o Bed/Chair-wheels locked  o Call light within reach  o RN notified  o LEs reclined     Compliance with Program/Exercises: Compliant all of the time. Recommendations/Intent for next treatment session:  Pt doing well all goals met and will do well at home with support from family. Patient will be discharged home with home health PT. No further Occupational Therapy warranted, will discharge Occupational Therapy services.         Total Treatment Duration:  OT Patient Time In/Time Out  Time In: 1100  Time Out: 50 Gaston Henderson

## 2020-09-02 NOTE — PROGRESS NOTES
Follow up from initial assessment    SOB evaluated: None noted    Breath Sounds: Clear    IS patient effort:Good  Patient achieved:  1500   Ml/sec    Patient resting comfortably, denies any discomfort at this time. Patient placed on continuous sat monitor  HS per protocol. Monitor history deleted prior to placing on patient. Patient working on IS. Set up patient's home CPAP.

## 2020-09-03 VITALS
HEIGHT: 71 IN | RESPIRATION RATE: 16 BRPM | HEART RATE: 72 BPM | DIASTOLIC BLOOD PRESSURE: 77 MMHG | OXYGEN SATURATION: 93 % | BODY MASS INDEX: 38.92 KG/M2 | WEIGHT: 278 LBS | TEMPERATURE: 98 F | SYSTOLIC BLOOD PRESSURE: 123 MMHG

## 2020-09-03 LAB — HGB BLD-MCNC: 10.6 G/DL (ref 11.7–15.4)

## 2020-09-03 PROCEDURE — 74011250637 HC RX REV CODE- 250/637: Performed by: ORTHOPAEDIC SURGERY

## 2020-09-03 PROCEDURE — 36415 COLL VENOUS BLD VENIPUNCTURE: CPT

## 2020-09-03 PROCEDURE — 97116 GAIT TRAINING THERAPY: CPT

## 2020-09-03 PROCEDURE — 74011250637 HC RX REV CODE- 250/637: Performed by: PHYSICIAN ASSISTANT

## 2020-09-03 PROCEDURE — 97110 THERAPEUTIC EXERCISES: CPT

## 2020-09-03 PROCEDURE — 85018 HEMOGLOBIN: CPT

## 2020-09-03 RX ADMIN — LISINOPRIL 10 MG: 5 TABLET ORAL at 08:54

## 2020-09-03 RX ADMIN — Medication 1 AMPULE: at 08:53

## 2020-09-03 RX ADMIN — ACETAMINOPHEN 1000 MG: 500 TABLET, FILM COATED ORAL at 01:06

## 2020-09-03 RX ADMIN — ACETAMINOPHEN 1000 MG: 500 TABLET, FILM COATED ORAL at 05:29

## 2020-09-03 RX ADMIN — DOCUSATE SODIUM 50 MG AND SENNOSIDES 8.6 MG 2 TABLET: 8.6; 5 TABLET, FILM COATED ORAL at 08:54

## 2020-09-03 RX ADMIN — ASPIRIN 81 MG: 81 TABLET, COATED ORAL at 08:53

## 2020-09-03 RX ADMIN — OXYCODONE 10 MG: 5 TABLET ORAL at 02:11

## 2020-09-03 RX ADMIN — CELECOXIB 200 MG: 200 CAPSULE ORAL at 08:53

## 2020-09-03 RX ADMIN — OXYCODONE 10 MG: 5 TABLET ORAL at 11:21

## 2020-09-03 RX ADMIN — METFORMIN HYDROCHLORIDE 1000 MG: 500 TABLET ORAL at 08:53

## 2020-09-03 RX ADMIN — PROMETHAZINE HYDROCHLORIDE 25 MG: 25 TABLET ORAL at 09:05

## 2020-09-03 RX ADMIN — Medication 5 ML: at 05:29

## 2020-09-03 RX ADMIN — OXYCODONE 10 MG: 5 TABLET ORAL at 06:14

## 2020-09-03 NOTE — PROGRESS NOTES
Shift assessment completed. Pt up in chair. Alert and oriented x4. Sensation present, plantar/dorsiflexion strong in bilateral lower extremities. Lung sounds clear bilaterally. No abnormal heart sounds. No complaints of nausea at this time. Chair locked, call light within reach.

## 2020-09-03 NOTE — PROGRESS NOTES
Shift assessment completed via flow sheet. Pt a/o x 4, resting quietly in bed. Respirations are even and unlabored. Lung sounds clear upon auscultation. No signs of distress or c/o pain at this time. Safety  Measures in place. Instructed pt to call for assistance. Call light within reach. Will continue to monitor. 2216 Assisted pt to bathroom and then back to bed. Pt c/o L knee pain soreness 5/10, Administered PRN Oxycodone 10 mg PO per MD order. Safety measures in place. Call light within reach. Instructed pt to call for assistance. Will continue to monitor. 0211 Pt c/o L knee soreness 6/10. Administered PRN Roxicodone 10 mg PO per MD order. Safety measures in place. Call light within reach. Instructed pt to call for assistance. Will continue to monitor.

## 2020-09-03 NOTE — PROGRESS NOTES
Problem: Mobility Impaired (Adult and Pediatric)  Goal: *Acute Goals and Plan of Care (Insert Text)  Outcome: Progressing Towards Goal  Note: GOALS (1-4 days):  (1.)Ms. Christi Lau will move from supine to sit and sit to supine  in bed with STAND BY ASSIST.  (2.)Ms. Christi Lau will transfer from bed to chair and chair to bed with STAND BY ASSIST using the least restrictive device. (3.)Ms. Paris will ambulate with STAND BY ASSIST for 100 feet with the least restrictive device. (4.)Ms. Paris will ambulate up/down 5 steps with right railing with MINIMAL ASSIST with device as needed. (5.)Ms. Paris will increase left knee ROM to 5°-80°.  ________________________________________________________________________________________________       PHYSICAL THERAPY JOINT CAMP TKA: Daily Note and AM 9/3/2020  INPATIENT: Hospital Day: 3  Payor: BLUE CROSS / Plan: [place] [employer] [facility] / Product Type: PPO /      NAME/AGE/GENDER: Emily Raphael is a 63 y.o. female   PRIMARY DIAGNOSIS:  Bilateral primary osteoarthritis of knee [M17.0]   Procedure(s) and Anesthesia Type:     * LEFT KNEE ARTHROPLASTY TOTAL ROBOTIC ASSISTED/ FERNANDA/ DAO - Spinal (Left)  ICD-10: Treatment Diagnosis:    · Pain in Left Knee (M25.562)  · Stiffness of Left Knee, Not elsewhere classified (M25.662)  · Difficulty in walking, Not elsewhere classified (R26.2)      ASSESSMENT:     Ms. Christi Lau presents with decreased functional mobility and gait as well as decreased rom and strength of left LE s/p left tka. She plans to go home with HHPT. Pt. In chair feeling better. She ambulated in the [place]o with RW working on step through gait. She did some steps. She did tka exercises with cues and assistance. Pain limited rom. She did not have any nausea or vomiting. She plans to stay until tomorrow. 9/3/20:  Patient participated well. Gait improved with distance. Able to increase knee flexion ROM. Ready to d/c today.     This section established at most recent assessment   PROBLEM LIST (Impairments causing functional limitations):  1. Decreased Strength  2. Decreased ADL/Functional Activities  3. Decreased Transfer Abilities  4. Decreased Ambulation Ability/Technique  5. Decreased Balance  6. Increased Pain  7. Decreased Activity Tolerance  8. Decreased Flexibility/Joint Mobility  9. Decreased [place] with Home Exercise Program   INTERVENTIONS PLANNED: (Benefits and precautions of physical therapy have been discussed with the patient.)  1. Bed Mobility  2. Cold  3. Gait Training  4. Home Exercise Program (HEP)  5. Range of Motion (ROM)  6. Therapeutic Activites  7. Therapeutic Exercise/Strengthening  8. Transfer Training     TREATMENT PLAN: Frequency/Duration: Follow patient BID for duration of hospital stay to address above goals. Rehabilitation Potential For Stated Goals: Good     RECOMMENDED REHABILITATION/EQUIPMENT: (at time of discharge pending progress): Continue Skilled Therapy and Home Health: Physical Therapy. HISTORY:   History of Present Injury/Illness (Reason for Referral):  S/p left tka  Past Medical History/Comorbidities:   Ms. Jaiden Bello  has a past medical history of Hypertension, SAWYER (obstructive sleep apnea), Osteoarthritis, and Type 2 diabetes mellitus ([facility]). Ms. Jaiden Bello  has a past surgical history that includes hx tonsillectomy and hx lap cholecystectomy.   Social History/Living Environment:   Home Environment: Private residence  # Steps to Enter: 5  One/Two Story Residence: Two story, live on 1st floor  Living Alone: No  Support Systems: Parent  Patient Expects to be Discharged to[de-identified] Private residence  Current DME Used/Available at Home: None  Prior Level of Function/Work/Activity:  independent   Number of Personal Factors/Comorbidities that affect the Plan of Care: 1-2: MODERATE COMPLEXITY   EXAMINATION:   Most Recent Physical Functioning:      Gross Assessment  AROM: Generally decreased, functional  Strength: Generally decreased, functional  Coordination: Generally decreased, functional          LLE AROM  L Knee Flexion: 81  L Knee Extension: 9     LLE Strength  L Hip Flexion: 2+  L Knee Extension: 2+  L Ankle Dorsiflexion: 3+         Transfers  Sit to Stand: Stand-by assistance  Stand to Sit: Stand-by assistance    Balance  Sitting: Intact  Standing: With support              Weight Bearing Status  Left Side Weight Bearing: As tolerated  Distance (ft): 175 Feet (ft)  Ambulation - Level of Assistance: Stand-by assistance  Assistive Device: Walker, rolling  Speed/Lizzie: Pace decreased (<100 feet/min)  Step Length: Right shortened  Stance: Left decreased  Gait Abnormalities: Antalgic        Braces/Orthotics:     Left Knee Cold  Type: Cryocuff      Body Structures Involved:  1. Bones  2. Joints  3. Muscles  4. Ligaments Body Functions Affected:  1. Movement Related Activities and Participation Affected:  1. Mobility   Number of elements that affect the Plan of Care: 3: MODERATE COMPLEXITY   CLINICAL PRESENTATION:   Presentation: Stable and uncomplicated: LOW COMPLEXITY   CLINICAL DECISION MAKIN [facility] [address] [id number] AM-PAC 6 Clicks   Basic Mobility Inpatient Short Form  How much difficulty does the patient currently have. .. Unable A Lot A Little None   1. Turning over in bed (including adjusting bedclothes, sheets and blankets)? [] 1   [] 2   [x] 3   [] 4   2. Sitting down on and standing up from a chair with arms ( e.g., wheelchair, bedside commode, etc.)   [] 1   [] 2   [x] 3   [] 4   3. Moving from lying on back to sitting on the side of the bed? [] 1   [] 2   [x] 3   [] 4   How much help from another person does the patient currently need. .. Total A Lot A Little None   4. Moving to and from a bed to a chair (including a wheelchair)? [] 1   [] 2   [x] 3   [] 4   5. Need to walk in hospital room? [] 1   [] 2   [x] 3   [] 4   6. Climbing 3-5 steps with a railing?    [] 1   [x] 2   [] 3   [] 4   © 2007, Trustees of [facility] [address] [address], under license to [employer]. All rights reserved     Score:  Initial: 17 Most Recent: X (Date: -- )    Interpretation of Tool:  Represents activities that are increasingly more difficult (i.e. Bed mobility, Transfers, Gait). Medical Necessity:     · Patient is expected to demonstrate progress in   · strength, range of motion, and balance  ·  to   · decrease assistance required with exercises and functional mobility  · . Reason for Services/Other Comments:  · Patient   · continues to require present interventions due to patient's inability to perform exercises and functional mobility independently  · . Use of outcome tool(s) and clinical judgement create a POC that gives a: Clear prediction of patient's progress: LOW COMPLEXITY            TREATMENT:   (In addition to Assessment/Re-Assessment sessions the following treatments were rendered)     Pre-treatment Symptoms/Complaints:   Patient agreeable. Pain Initial:   Pain Intensity 1: 4  Post Session:  4/10     Therapeutic Exercise: (15 Minutes):  Exercises per grid below to improve mobility and strength. Required minimal visual, verbal, and manual cues to promote proper body alignment, promote proper body posture, and promote proper body mechanics. Progressed range and repetitions as indicated. Gait Training (10 Minutes):  Gait training to improve and/or restore physical functioning as related to mobility, strength and balance. Ambulated 175 Feet (ft) with Stand-by assistance using a Walker, rolling and minimal   related to their stance phase to promote proper body alignment, promote proper body posture and promote proper body mechanics.       Date:  9/1/20 Date:  9/2 Date:  9/3/20   ACTIVITY/EXERCISE AM PM AM PM AM PM   GROUP THERAPY  []  []  []  []  []  []   Ankle Pumps  10a 10a 15a 20    Quad Sets  10a 10a 15a 20    Gluteal Sets  10a 10a 15a 20    Hip ABd/ADduction  10a 10a 15a 20    Straight Leg Raises   10aa 15aa 20 AA    Knee Slides 10a 10aa 15aa 20 AA    Short Arc Quads    15aa 20    Long Arc Quads         Chair Slides    15a 20             B = bilateral; AA = active assistive; A = active; P = passive      Treatment/Session Assessment:     Response to Treatment:   Participated well and gait improved with distance. Education:  [x] Home Exercises  [x] Fall Precautions  []  [x] D/C Instruction Review  [x] Knee Prosthesis Review  [x] Walker Management/Safety [] Adaptive Equipment as Needed       Interdisciplinary Collaboration:   o Physical Therapist  o Registered Nurse    After treatment position/precautions:   o Up in chair  o Bed/Chair-wheels locked  o Bed in low position  o Call light within reach    Compliance with Program/Exercises: Will assess as treatment progresses. Recommendations/Intent for next treatment session:  Treatment next visit will focus on increasing Ms. Paris's independence with bed mobility, transfers, gait training, strength/ROM exercises, modalities for pain, and patient education.       Total Treatment Duration:  PT Patient Time In/Time Out  Time In: 0805  Time Out: [address], PT

## 2020-09-03 NOTE — PROGRESS NOTES
September 3, 2020         Post Op day: 2 Days Post-Op   Admit Diagnosis: Bilateral primary osteoarthritis of knee [M17.0]  Arthritis of left knee [M17.12]  LAB:    Recent Results (from the past 24 hour(s))   HEMOGLOBIN    Collection Time: 20  3:36 AM   Result Value Ref Range    HGB 10.6 (L) 11.7 - 15.4 g/dL     Vital Signs:    Patient Vitals for the past 8 hrs:   BP Temp Pulse Resp SpO2   20 2350 122/64 98 °F (36.7 °C) 78 16 92 %     Temp (24hrs), Av.1 °F (36.7 °C), Min:98 °F (36.7 °C), Max:98.2 °F (36.8 °C)    Pain Control:   Pain Assessment  Pain Scale 1: Numeric (0 - 10)  Pain Intensity 1: 5  Pain Onset 1: YRS  Pain Location 1: Knee  Pain Orientation 1: Left  Pain Description 1: Aching, Sore  Pain Intervention(s) 1: Medication (see MAR)  Subjective: Doing well, pain is well controlled, no complaints     Objective:  No Acute Distress, Alert and Oriented, left knee dressing is C/D/I. Neurovascular exam is normal       Assessment:   Patient Active Problem List   Diagnosis Code    Arthritis of left knee M17.12       Status Post Procedure(s) (LRB):  LEFT KNEE ARTHROPLASTY TOTAL ROBOTIC ASSISTED/ FERNANDA/ DAO (Left)        Plan: Continue Physical Therapy, Monitor Hgb. ASA/SCDs for DVT prophylaxis. D/c to home today.   Patient was seen by Dr. Idania Espinal this AM.    Signed By: ANA Cedillo

## 2020-09-03 NOTE — PROGRESS NOTES
Pt resting quietly in bed. No needs expressed at this time. Safety measures in place. Call light within reach. Will continue to monitor.

## 2020-09-03 NOTE — PROGRESS NOTES
Problem: Diabetes Self-Management  Goal: *Disease process and treatment process  Description: Define diabetes and identify own type of diabetes; list 3 options for treating diabetes. Outcome: Progressing Towards Goal  Goal: *Incorporating nutritional management into lifestyle  Description: Describe effect of type, amount and timing of food on blood glucose; list 3 methods for planning meals. Outcome: Progressing Towards Goal  Goal: *Incorporating physical activity into lifestyle  Description: State effect of exercise on blood glucose levels. Outcome: Progressing Towards Goal  Goal: *Developing strategies to promote health/change behavior  Description: Define the ABC's of diabetes; identify appropriate screenings, schedule and personal plan for screenings. Outcome: Progressing Towards Goal  Goal: *Using medications safely  Description: State effect of diabetes medications on diabetes; name diabetes medication taking, action and side effects. Outcome: Progressing Towards Goal  Goal: *Monitoring blood glucose, interpreting and using results  Description: Identify recommended blood glucose targets  and personal targets. Outcome: Progressing Towards Goal  Goal: *Prevention, detection, treatment of acute complications  Description: List symptoms of hyper- and hypoglycemia; describe how to treat low blood sugar and actions for lowering  high blood glucose level. Outcome: Progressing Towards Goal  Goal: *Prevention, detection and treatment of chronic complications  Description: Define the natural course of diabetes and describe the relationship of blood glucose levels to long term complications of diabetes.   Outcome: Progressing Towards Goal  Goal: *Developing strategies to address psychosocial issues  Description: Describe feelings about living with diabetes; identify support needed and support network  Outcome: Progressing Towards Goal  Goal: *Insulin pump training  Outcome: Progressing Towards Goal  Goal: *Sick day guidelines  Outcome: Progressing Towards Goal  Goal: *Patient Specific Goal (EDIT GOAL, INSERT TEXT)  Outcome: Progressing Towards Goal     Problem: Patient Education: Go to Patient Education Activity  Goal: Patient/Family Education  Outcome: Progressing Towards Goal     Problem: Falls - Risk of  Goal: *Absence of Falls  Description: Document Nahomi Parks Fall Risk and appropriate interventions in the flowsheet.   Outcome: Progressing Towards Goal  Note: Fall Risk Interventions:  Mobility Interventions: OT consult for ADLs, Patient to call before getting OOB, PT Consult for mobility concerns, PT Consult for assist device competence, Strengthening exercises (ROM-active/passive), Utilize walker, cane, or other assistive device         Medication Interventions: Patient to call before getting OOB, Teach patient to arise slowly    Elimination Interventions: Call light in reach, Patient to call for help with toileting needs              Problem: Patient Education: Go to Patient Education Activity  Goal: Patient/Family Education  Outcome: Progressing Towards Goal     Problem: Patient Education: Go to Patient Education Activity  Goal: Patient/Family Education  Outcome: Progressing Towards Goal     Problem: Patient Education: Go to Patient Education Activity  Goal: Patient/Family Education  Outcome: Progressing Towards Goal

## 2020-09-03 NOTE — DISCHARGE INSTRUCTIONS
[id number] [facility]   Patient Discharge Instructions    Cliff Hall / [id number] : 1955    Admitted 2020 Discharged: 9/3/2020     IF YOU HAVE ANY PROBLEMS ONCE YOU ARE AT HOME CALL THE FOLLOWING NUMBERS:   Main office number: [phone]    Take Home Medications    · It is important that you take the medication exactly as they are prescribed. · Keep your medication in the bottles provided by the pharmacist and keep a list of the medication names, dosages, and times to be taken in your wallet. · Do not take other medications without consulting your doctor. What to do at [address] your prehospital diet. If you have excessive nausea or vomitting call your doctor's office     Home Physical Therapy is arranged. Use rolling walker when walking. Patients who have had a joint replacement should not drive until you are seen for your follow up appointment by Dr. Venice Feliz. When to Call    - Call if you have a temperature greater then 101  - Unable to keep food down  - Loose control of your bladder or bowel function  - Are unable to bear any weight   - Need a pain medication refill       DISCHARGE SUMMARY from Nurse    The following personal items collected during your admission are returned to you:   Dental Appliance: Dental Appliances: None  Vision: Visual Aid: Contacts  Hearing Aid:    Jewelry: Jewelry: None  Clothing: Clothing: Other (comment)(3 BAG OF BELONGINGS AND PURSE)  Other Valuables:  Other Valuables: Cell Phone, Purse(PURSE AND PHONE IN MANAGERS OFFICE)  Valuables sent to safe:      PATIENT INSTRUCTIONS:    After general anesthesia or intravenous sedation, for 24 hours or while taking prescription Narcotics:  · Limit your activities  · Do not drive and operate hazardous machinery  · Do not make important personal or business decisions  · Do  not drink alcoholic beverages  · If you have not urinated within 8 hours after discharge, please contact your surgeon on call.    Report the following to your surgeon:  · Excessive pain, swelling, redness or odor of or around the surgical area  · Temperature over 101  · Nausea and vomiting lasting longer than 4 hours or if unable to take medications  · Any signs of decreased circulation or nerve impairment to extremity: change in color, persistent  numbness, tingling, coldness or increase pain  · Any questions, call office @ [phone]      Keep scheduled follow up appointment. If need to change, call office @ [phone]. *  Please give a list of your current medications to your Primary Care Provider. *  Please update this list whenever your medications are discontinued, doses are      changed, or new medications (including over-the-counter products) are added. *  Please carry medication information at all times in case of emergency situations. Patient Education        Total Knee Replacement: What to Expect at [address] [place] [address] Drive had a total knee replacement. The doctor replaced the worn ends of the bones that connect to your knee (thighbone and lower leg bone) with plastic and metal parts. When you leave the hospital, you should be able to move around with a walker or crutches. But you will need someone to help you at home for the next few weeks or until you have more energy and can move around better. If you need more extensive rehab, you may go to a specialized rehab center for more treatment. You will go home with a bandage and stitches, staples, tissue glue, or tape strips. Change the bandage as your doctor tells you to. If you have stitches or staples, your doctor will remove them 10 to 21 days after your surgery. Glue or tape strips will fall off on their own over time. You may still have some mild pain, and the area may be swollen for 3 to 6 months after surgery. Your knee will continue to improve for 6 to 12 months. You will probably use a walker for 1 to 3 weeks and then use crutches.  When you are ready, you can use a cane. You will probably be able to walk on your own in 4 to 8 weeks. You will need to do months of physical rehabilitation (rehab) after a knee replacement. Rehab will help you strengthen the muscles of the knee and help you regain movement. After you recover, your artificial knee will allow you to do normal daily activities with less pain or no pain at all. You may be able to hike, dance, ride a bike, and play golf. Talk to your doctor about whether you can do more strenuous activities. Always tell your caregivers that you have an artificial knee. How long it will take to walk on your own, return to normal activities, and go back to work depends on your health and how well your rehabilitation (rehab) program goes. The better you do with your rehab exercises, the quicker you will get your strength and movement back. This care sheet gives you a general idea about how long it will take for you to recover. But each person recovers at a different pace. Follow the steps below to get better as quickly as possible. How can you care for yourself at home? Activity  · Rest when you feel tired. You may take a nap, but don't stay in bed all day. When you sit, use a chair with arms. You can use the arms to help you stand up. · Work with your physical therapist to find the best way to exercise. What you can do as your knee heals will depend on whether your new knee is cemented or uncemented. You may not be able to do certain things for a while if your new knee is uncemented. · After your knee has healed enough, you can do more strenuous activities with caution. ? You can golf, but use a golf cart. And don't wear shoes with spikes. ? You can bike on a flat road or on a stationary bike. Avoid biking up hills. ? Your doctor may suggest that you stay away from activities that put stress on your knee.  These include tennis, badminton, squash, racquetball, contact sports like football, jumping (such as in basketball), jogging, and running. ? Avoid activities where you might fall. These include horseback riding, skiing, and mountain biking. · Do not sit for more than 1 hour at a time. Get up and walk around for a while before you sit again. If you must sit for a long time, prop up your leg with a chair or footstool. This will help you avoid swelling. · Ask your doctor when you can drive again. It may take up to 8 weeks after knee replacement surgery before it's safe for you to drive. · When you get into a car, sit on the edge of the seat. Then pull in your legs, and turn to face the front. · You should be able to do many everyday activities 3 to 6 weeks after your surgery. You will probably need to take 4 to 16 weeks off from work. When you can go back to work depends on the type of work you do and how you feel. · Ask your doctor when it is okay for you to have sex. · For 12 weeks, do not lift anything heavier than 10 pounds and do not lift weights. Diet  · By the time you leave the hospital, you should be eating your normal diet. If your stomach is upset, try bland, low-fat foods like plain rice, broiled chicken, toast, and yogurt. Your doctor may suggest that you take iron and vitamin supplements. · Drink plenty of fluids (unless your doctor tells you not to). · Eat healthy foods, and watch your portion sizes. Try to stay at your ideal weight. Too much weight puts more stress on your new knee. · You may notice that your bowel movements are not regular right after your surgery. This is common. Try to avoid constipation and straining with bowel movements. You may want to take a fiber supplement every day. If you have not had a bowel movement after a couple of days, ask your doctor about taking a mild laxative. Medicines  · Your doctor will tell you if and when you can restart your medicines. He or she will also give you instructions about taking any new medicines.   · If you take aspirin or some other blood thinner, ask your doctor if and when to start taking it again. Make sure that you understand exactly what your doctor wants you to do. · Your doctor may give you a blood-thinning medicine to prevent blood clots. If you take a blood thinner, be sure you get instructions about how to take your medicine safely. Blood thinners can cause serious bleeding problems. This medicine could be in pill form or as a shot (injection). If a shot is needed, your doctor will tell you how to do this. · Be safe with medicines. Take pain medicines exactly as directed. ? If the doctor gave you a prescription medicine for pain, take it as prescribed. ? If you are not taking a prescription pain medicine, ask your doctor if you can take an over-the-counter medicine. ? Plan to take your pain medicine 30 minutes before exercises. It is easier to prevent pain before it starts than to stop it after it has started. · If you think your pain medicine is making you sick to your stomach:  ? Take your medicine after meals (unless your doctor has told you not to). ? Ask your doctor for a different pain medicine. · If your doctor prescribed antibiotics, take them as directed. Do not stop taking them just because you feel better. You need to take the full course of antibiotics. Incision care  · If your doctor told you how to care for your cut (incision), follow your doctor's instructions. You will have a dressing over the cut. A dressing helps the incision heal and protects it. Your doctor will tell you how to take care of this. · If you did not get instructions, follow this general advice:  ? If you have strips of tape on the cut the doctor made, leave the tape on for a week or until it falls off.  ? If you have stitches or staples, your doctor will tell you when to come back to have them removed. ? If you have skin adhesive on the cut, leave it on until it falls off. Skin adhesive is also called glue or liquid stitches.   ? Change the bandage every day. ? Wash the area daily with warm water, and pat it dry. Don't use hydrogen peroxide or alcohol. They can slow healing. ? You may cover the area with a gauze bandage if it oozes fluid or rubs against clothing. ? You may shower 24 to 48 hours after surgery. Pat the incision dry. Don't swim or take a bath for the first 2 weeks, or until your doctor tells you it is okay. Exercise  · Your rehab program will give you a number of exercises to do to help you get back your knee's range of motion and strength. Always do them as your therapist tells you. Ice  · For pain and swelling, put ice or a cold pack on the area for 10 to 20 minutes at a time. Put a thin cloth between the ice and your skin. Other instructions  · Keep wearing your support stockings as your doctor says. These help to prevent blood clots. How long you'll have to wear them depends on your activity level and the amount of swelling. · Carry a medical alert card that says you have an artificial joint. You have metal pieces in your knee. These may set off some airport metal detectors. Follow-up care is a key part of your treatment and safety. Be sure to make and go to all appointments, and call your doctor if you are having problems. It's also a good idea to know your test results and keep a list of the medicines you take. When should you call for help? [id number] anytime you think you may need emergency care. For example, call if:  · You passed out (lost consciousness). · You have severe trouble breathing. · You have sudden chest pain and shortness of breath, or you cough up blood. Call your doctor now or seek immediate medical care if:  · You have signs of infection, such as:  ? Increased pain, swelling, warmth, or redness. ? Red streaks leading from the incision. ? Pus draining from the incision. ? A fever. · You have signs of a blood clot, such as:  ? Pain in your calf, back of the knee, thigh, or groin. ?  Redness and swelling in your leg or groin. · Your incision comes open and begins to bleed, or the bleeding increases. · You have pain that does not get better after you take pain medicine. Watch closely for changes in your health, and be sure to contact your doctor if:  · You do not have a bowel movement after taking a laxative. Where can you learn more? Go to http://www.gray.com/  Enter T054 in the search box to learn more about \"Total Knee Replacement: What to Expect at Home. \"  Current as of: March 2, 2020               Content Version: 12.5  © 8615-2253 [employer]. Care instructions adapted under license by [employer] (which disclaims liability or warranty for this information). If you have questions about a medical condition or this instruction, always ask your healthcare professional. [address] any warranty or liability for your use of this information. These are general instructions for a healthy lifestyle:    No smoking/ No tobacco products/ Avoid exposure to second hand smoke    Surgeon General's Warning:  Quitting smoking now greatly reduces serious risk to your health. Obesity, smoking, and sedentary lifestyle greatly increases your risk for illness    A healthy diet, regular physical exercise & weight monitoring are important for maintaining a healthy lifestyle    You may be retaining fluid if you have a history of heart failure or if you experience any of the following symptoms:  Weight gain of 3 pounds or more overnight or 5 pounds in a week, increased swelling in our hands or feet or shortness of breath while lying flat in bed. Please call your doctor as soon as you notice any of these symptoms; do not wait until your next office visit.     Recognize signs and symptoms of STROKE:    F-face looks uneven    A-arms unable to move or move even    S-speech slurred or non-existent    T-time-call 911 as soon as signs and symptoms begin-DO NOT go       Back to bed or wait to see if you get better-TIME IS BRAIN. The discharge information has been reviewed with the patient. The patient verbalized understanding. Information obtained by :  I understand that if any problems occur once I am at home I am to contact my physician. I understand and acknowledge receipt of the instructions indicated above.                                                                                                                                            Physician's or R.N.'s Signature                                                                  Date/Time                                                                                                                                              Patient or Representative Signature                                                          Date/Time

## 2020-09-04 ENCOUNTER — PATIENT OUTREACH (OUTPATIENT)
Dept: CASE MANAGEMENT | Age: 65
End: 2020-09-04

## 2020-09-04 NOTE — DISCHARGE SUMMARY
[facility]  Total Joint Discharge Summary      Patient ID:  Chela Wilson  [id number]  63 y.o.  1955    Admit date: 9/1/2020  Discharge date and time: 9/3/2020  Admitting Physician: El Moreno MD  Surgeon: Same  Admission Diagnoses: Bilateral primary osteoarthritis of knee [M17.0]  Arthritis of left knee [M17.12]  Discharge Diagnoses: Active Problems:    Arthritis of left knee (9/1/2020)    Procedure:  Left robot-assisted total knee arthroplasty performed on 7/5/8698 without complication. Perioperative Antibiotics: Ancef 1 to 2 mg was given depending on patient's weight. If allergic to Ancef or due to other indications, patient was given Vancomycin. Hospital Medications given:   [unfilled]  [unfilled]  [unfilled]    Discharge Medications given:  Cannot display discharge medications since this patient is not currently admitted. Additional DVT Prophylaxis:  EMEKA Hose,Plexi-Pulse    Postoperative transfusions:   none  Post Op complications: none    Hemoglobin at discharge:   Lab Results   Component Value Date/Time    HGB 10.6 (L) 09/03/2020 03:36 AM       Wound appears to be healing without any evidence of infection. Physical Therapy started on the day following surgery and progressed to independent ambulation with the aid of a walker. At the time of discharge, able to go up and down stairs and had understanding of precautions needed following surgery. PT/OT:            Assistive Device: Walker (comment)        LLE AROM  L Knee Flexion: 81  L Knee Extension: 9       Discharged to: home    Discharge instructions:  -Rx pain medication given  - Anticoagulate with: Ecotrin 81 mg PO BID x 4 weeks  -Keep aquacel dressing in place.   Change as needed.  -Discontinue staples and apply benzoin and steri-strips on POD # 10, if wound is healed.    -Resume pre hospital diet             -Resume home medications per medical continuation form -Ambulate with walker, appropriate total joint protocol  -Follow up in office as scheduled       Signed:  ANA Salcedo  9/4/2020  11:35 AM

## 2020-09-04 NOTE — PROGRESS NOTES
Unable to reach patient by phone after ED/Hospital visit for at risk Covid education. Left message. Visit not related to to Covid and patient does not have [facility] PCP, so only one attempt needed.

## 2020-09-05 ENCOUNTER — HOME CARE VISIT (OUTPATIENT)
Dept: SCHEDULING | Facility: HOME HEALTH | Age: 65
End: 2020-09-05
Payer: COMMERCIAL

## 2020-09-05 VITALS
DIASTOLIC BLOOD PRESSURE: 78 MMHG | HEART RATE: 98 BPM | SYSTOLIC BLOOD PRESSURE: 138 MMHG | RESPIRATION RATE: 20 BRPM | TEMPERATURE: 98.7 F

## 2020-09-05 PROCEDURE — G0151 HHCP-SERV OF PT,EA 15 MIN: HCPCS

## 2020-09-05 PROCEDURE — 400013 HH SOC

## 2020-09-07 ENCOUNTER — HOME CARE VISIT (OUTPATIENT)
Dept: SCHEDULING | Facility: HOME HEALTH | Age: 65
End: 2020-09-07
Payer: COMMERCIAL

## 2020-09-07 VITALS
DIASTOLIC BLOOD PRESSURE: 73 MMHG | SYSTOLIC BLOOD PRESSURE: 126 MMHG | HEART RATE: 95 BPM | RESPIRATION RATE: 17 BRPM | TEMPERATURE: 97.7 F

## 2020-09-07 PROCEDURE — G0151 HHCP-SERV OF PT,EA 15 MIN: HCPCS

## 2020-09-08 ENCOUNTER — HOME CARE VISIT (OUTPATIENT)
Dept: HOME HEALTH SERVICES | Facility: HOME HEALTH | Age: 65
End: 2020-09-08
Payer: COMMERCIAL

## 2020-09-09 ENCOUNTER — HOSPITAL ENCOUNTER (OUTPATIENT)
Dept: SLEEP MEDICINE | Age: 65
Discharge: HOME | End: 2020-09-09
Payer: COMMERCIAL

## 2020-09-09 ENCOUNTER — HOME CARE VISIT (OUTPATIENT)
Dept: SCHEDULING | Facility: HOME HEALTH | Age: 65
End: 2020-09-09
Payer: COMMERCIAL

## 2020-09-09 VITALS
DIASTOLIC BLOOD PRESSURE: 80 MMHG | TEMPERATURE: 97.3 F | SYSTOLIC BLOOD PRESSURE: 136 MMHG | RESPIRATION RATE: 17 BRPM | HEART RATE: 102 BPM

## 2020-09-09 PROCEDURE — G0151 HHCP-SERV OF PT,EA 15 MIN: HCPCS

## 2020-09-09 PROCEDURE — 95806 SLEEP STUDY UNATT&RESP EFFT: CPT

## 2020-09-11 ENCOUNTER — HOME CARE VISIT (OUTPATIENT)
Dept: SCHEDULING | Facility: HOME HEALTH | Age: 65
End: 2020-09-11
Payer: COMMERCIAL

## 2020-09-11 VITALS
RESPIRATION RATE: 17 BRPM | DIASTOLIC BLOOD PRESSURE: 86 MMHG | HEART RATE: 102 BPM | SYSTOLIC BLOOD PRESSURE: 180 MMHG | TEMPERATURE: 97.4 F

## 2020-09-11 PROCEDURE — G0151 HHCP-SERV OF PT,EA 15 MIN: HCPCS

## 2020-09-14 ENCOUNTER — PATIENT OUTREACH (OUTPATIENT)
Dept: CASE MANAGEMENT | Age: 65
End: 2020-09-14

## 2020-09-14 PROCEDURE — A4649 SURGICAL SUPPLIES: HCPCS

## 2020-09-14 NOTE — PROGRESS NOTES
Patient contacted regarding recent discharge and COVID-19 risk. Discussed COVID-19 related testing which was available at this time. Test results were negative. Patient informed of results, if available? yes    Care Transition Nurse/ Ambulatory Care Manager/ LPN Care Coordinator contacted the patient by telephone to perform post discharge assessment. Verified name and  with patient as identifiers. Patient has following risk factors of: diabetes and osteoarthritis, irena, htn. CTN/ACM/LPN reviewed discharge instructions, medical action plan and red flags related to discharge diagnosis. Reviewed and educated them on any new and changed medications related to discharge diagnosis. Advised obtaining a 90-day supply of all daily and as-needed medications. Advance Care Planning:   Does patient have an Advance Directive: currently not on file; education provided     Education provided regarding infection prevention, and signs and symptoms of COVID-19 and when to seek medical attention with patient who verbalized understanding. Discussed exposure protocols and quarantine from [address] you at higher risk for severe illness  and given an opportunity for questions and concerns. The patient agrees to contact the COVID-19 hotline [phone] or PCP office for questions related to their healthcare. CTN/ACM/LPN provided contact information for future reference. From CDC: Are you at higher risk for severe illness?  Wash your hands often.  Avoid close contact (6 feet, which is about two arm lengths) with people who are sick.  Put distance between yourself and other people if COVID-19 is spreading in your community.  Clean and disinfect frequently touched surfaces.  Avoid all cruise travel and non-essential air travel.  Call your healthcare professional if you have concerns about COVID-19 and your underlying condition or if you are sick.     For more information on steps you can take to protect yourself, see CDC's How to Protect Yourself      Patient/family/caregiver given information for [employer] Loop and agrees to enroll no  Patient's preferred e-mail: no email  Patient's preferred phone number: [phone]  Based on Loop alert triggers, patient will be contacted by nurse care manager for worsening symptoms. Patient stated she is doing well, still having issues with pain. Has notified physician office. Awaiting return call. Episode closed.

## 2020-09-15 ENCOUNTER — HOME CARE VISIT (OUTPATIENT)
Dept: SCHEDULING | Facility: HOME HEALTH | Age: 65
End: 2020-09-15
Payer: COMMERCIAL

## 2020-09-15 ENCOUNTER — HOME CARE VISIT (OUTPATIENT)
Dept: HOME HEALTH SERVICES | Facility: HOME HEALTH | Age: 65
End: 2020-09-15
Payer: COMMERCIAL

## 2020-09-15 VITALS
DIASTOLIC BLOOD PRESSURE: 88 MMHG | OXYGEN SATURATION: 96 % | HEART RATE: 80 BPM | RESPIRATION RATE: 17 BRPM | SYSTOLIC BLOOD PRESSURE: 148 MMHG | TEMPERATURE: 98.2 F

## 2020-09-15 PROCEDURE — G0157 HHC PT ASSISTANT EA 15: HCPCS

## 2020-09-18 ENCOUNTER — HOME CARE VISIT (OUTPATIENT)
Dept: SCHEDULING | Facility: HOME HEALTH | Age: 65
End: 2020-09-18
Payer: COMMERCIAL

## 2020-09-18 VITALS
SYSTOLIC BLOOD PRESSURE: 138 MMHG | DIASTOLIC BLOOD PRESSURE: 75 MMHG | RESPIRATION RATE: 17 BRPM | TEMPERATURE: 98 F | HEART RATE: 90 BPM

## 2020-09-18 PROCEDURE — G0151 HHCP-SERV OF PT,EA 15 MIN: HCPCS

## 2021-02-05 PROBLEM — M25.561 RIGHT KNEE PAIN: Status: ACTIVE | Noted: 2021-02-05

## 2022-03-19 PROBLEM — M17.12 ARTHRITIS OF LEFT KNEE: Status: ACTIVE | Noted: 2020-09-01

## 2022-03-20 PROBLEM — M25.561 RIGHT KNEE PAIN: Status: ACTIVE | Noted: 2021-02-05

## 2022-10-12 ENCOUNTER — TELEPHONE (OUTPATIENT)
Dept: ORTHOPEDIC SURGERY | Age: 67
End: 2022-10-12

## 2022-10-12 NOTE — TELEPHONE ENCOUNTER
Pt called and needs a letter stating shes had a knee replacement. She is unable to get through security at [address] [place] [facility] [employer] without it. Please mail patient the letter to address on file.

## 2022-11-01 ENCOUNTER — TELEPHONE (OUTPATIENT)
Dept: ORTHOPEDIC SURGERY | Age: 67
End: 2022-11-01

## 2022-11-01 NOTE — TELEPHONE ENCOUNTER
Patient needs a letter stating she has had knee surg to show @ [place] [employer], not the opnote but a letter stating such.  Can you let her know when she can pick that up

## 2022-12-06 ENCOUNTER — TELEPHONE (OUTPATIENT)
Dept: ORTHOPEDIC SURGERY | Age: 67
End: 2022-12-06

## 2022-12-07 ENCOUNTER — OFFICE VISIT (OUTPATIENT)
Dept: ORTHOPEDIC SURGERY | Age: 67
End: 2022-12-07

## 2022-12-07 VITALS — WEIGHT: 285.2 LBS | HEIGHT: 67 IN | BODY MASS INDEX: 44.76 KG/M2

## 2022-12-07 DIAGNOSIS — M16.12 UNILATERAL PRIMARY OSTEOARTHRITIS, LEFT HIP: Primary | ICD-10-CM

## 2022-12-07 DIAGNOSIS — G89.29 CHRONIC BILATERAL LOW BACK PAIN, UNSPECIFIED WHETHER SCIATICA PRESENT: ICD-10-CM

## 2022-12-07 DIAGNOSIS — M17.11 PRIMARY OSTEOARTHRITIS OF RIGHT KNEE: ICD-10-CM

## 2022-12-07 DIAGNOSIS — Z96.652 TOTAL KNEE REPLACEMENT STATUS, LEFT: ICD-10-CM

## 2022-12-07 DIAGNOSIS — M25.561 ACUTE PAIN OF RIGHT KNEE: ICD-10-CM

## 2022-12-07 DIAGNOSIS — M54.50 CHRONIC BILATERAL LOW BACK PAIN, UNSPECIFIED WHETHER SCIATICA PRESENT: ICD-10-CM

## 2022-12-07 DIAGNOSIS — Z09 SURGERY FOLLOW-UP: ICD-10-CM

## 2022-12-07 RX ORDER — METHYLPREDNISOLONE ACETATE 40 MG/ML
40 INJECTION, SUSPENSION INTRA-ARTICULAR; INTRALESIONAL; INTRAMUSCULAR; SOFT TISSUE ONCE
Status: COMPLETED | OUTPATIENT
Start: 2022-12-07 | End: 2022-12-07

## 2022-12-07 RX ADMIN — METHYLPREDNISOLONE ACETATE 40 MG: 40 INJECTION, SUSPENSION INTRA-ARTICULAR; INTRALESIONAL; INTRAMUSCULAR; SOFT TISSUE at 14:08

## 2022-12-07 NOTE — PROGRESS NOTES
Name: Koko Moreno  YOB: 1955  Gender: female  MRN: [id number]    CC: Right knee pain, left hip pain, concerns with left total knee arthroplasty performed by Dr. Cortez Tidwell request for second opinion    HPI: Koko Moreno is a 74 y.o. female who presents with multiple complaints. She underwent left total knee arthroplasty under the care of Dr. Cortez Tidwell with a Switzer tricompartmental implant with a resurfaced patella and cementless fixation with Estiven technique approximately 1-1/2 years ago. She has seen him several times since. She states that her left knee is really never improved from surgery. She describes primarily anterior knee pain. Prior to her knee replacement surgery she was followed by Dr. Raad Hopper for several years who treated her with intra-articular injections which she felt was helpful but got some 3-4 times a year. With her dissatisfaction with the result from her left total knee arthroplasty she did seek a second opinion at [facility]. She has been previously diagnosed to have an arthritic left hip here and there was some concern that this was referring pain to the left knee and interfering with her recovery from her left total knee arthroplasty. She had an intra-articular injection under fluoroscopy with Dr. Cortez Tidwell which she states helped for just for a few days. She has seen Dr. Kusum Weiss at [facility] and has had total hip arthroplasty scheduled on the left. This was scheduled for the fall but this was canceled because of an elevated HbA1c. She has been working with her primary care doctor to get HbA1c to a more manageable level with some success and is now back on the schedule with Dr. Kusum Weiss in February for total of arthroplasty on the left.     With that history on her left side she has questions regarding the advisability of a total of arthroplasty, the etiology of continued knee pain of the left total knee arthroplasty, and additionally has complaints regarding her right knee. She states that her right knee hurts as it typically does and she really just wants a cortisone shot there. She states that in anticipation of total hip arthroplasty she like her knee to be is in good situation as it can be. History was obtained from patient    ROS/Meds/PSH/PMH/FH/SH: I personally reviewed the patients standard intake form. Below are the pertinents    Tobacco:  reports that she has never smoked. She has never used smokeless tobacco.  Past Medical History:   Diagnosis Date    Hypertension     JAGJIT (obstructive sleep apnea)     CPAP QHS    Osteoarthritis     Type 2 diabetes mellitus ([facility] [address]     controlled with oral medication and insulin, does not check BS daily, no problems with hypoglycemia, Hemoglobin A1c 7.4 on 8/18/20. Past Surgical History:   Procedure Laterality Date    CHOLECYSTECTOMY, LAPAROSCOPIC      TONSILLECTOMY          Physical Examination:  Physical exam: On examination of the patient's gait there is the patient uses a cane in the right hand appropriately she is noted to have a flex contracture in stance of the left knee with good coronal alignment. She does have some antalgia present in stance more so on the left than the right. On examination while standing there is decreased flexion in the lumbosacral spine without acute list or spasm. While seated ,  the Left hip is examined there is decreased range of motion, there is significant groin and anterolateral thigh tenderness, hip flexion maneuvers against resistance reproduce the presenting complaint, there is trochanteric tenderness to direct palpation, and although certainly present in the anterolateral hip to the left knee, her findings are somewhat diffuse on the left side. . On examination of the  Right hip  she has good range of motion there was some trochanteric tenderness without significant obvious reproduction of pain to the right knee.  .     On examination of the  Left knee :ROM is 12 to 120 There is no obvious effusion and she is tender anteriorly with a well-healed anterior midline incision and no significant instability to the flexion arc. . On examination of the  Right knee :ROM is 0 to 125 There is a mild effusion. Patient is neurologically intact distally. Skin is intact. Imaging:   Radiographs: An AP standing, skiers, flexion lateral, and sunrise view of the right knee knee was obtained  This demonstrated   a moderate degree of joint space narrowing laterally of the right knee with moderate tricompartmental osteophyte formation. Incidentally noted is a right total knee arthroplasty on the AP skiers and sunrise view demonstrating a well fixed cementless implant with a resurfaced patella that tracks centrally. Radiographic impression: moderate DJD right Knee    Also review hip x-rays from April 2022. These do demonstrate relatively significant in joint with his left hip with joint space narrowing subchondral cystic change and mild to moderate change in the right hip. Radiographic impression DJD left hip. Assessment:   Degenerative joint disease left hip with likely referral of pain to the left knee. I suspect that her left knee replacement has been compromised by the presence of significant and progressive osteophytes of the left hip. Her x-rays prior to her previous total knee arthroplasty of her left hip did not show as advanced change and certainly has has progressed since that surgery was performed. She did not achieve full extension and recovery on the left total knee arthroplasty as well which also likely impacts her outcome. There is almost certainly some lumbar spine degenerative disc disease impacting her recovery as well.     I have advised her that proceeding with total hip arthroplasty is probably her best chance of improving her functional level on the left side but she may persist in a flexion contracture with her left knee that may ultimately , Rfl: Allergies   Allergen Reactions    Ampicillin Diarrhea       CC:right knee pain    Impression: Osteoarthritis of the right knee    Procedure: Depomedrol injection     Procedure Note: The right knee was prepped with alcohol. Then the right knee was injected with 1 mL of 0.5% Marcaine and 40mg of depomedrol The patient tolerated the procedure without difficulty.       Poonam Duong MD

## 2025-02-28 ENCOUNTER — OFFICE VISIT (OUTPATIENT)
Dept: ORTHOPEDIC SURGERY | Age: 70
End: 2025-02-28

## 2025-02-28 DIAGNOSIS — M25.561 RIGHT KNEE PAIN, UNSPECIFIED CHRONICITY: Primary | ICD-10-CM

## 2025-02-28 RX ORDER — METHYLPREDNISOLONE ACETATE 40 MG/ML
40 INJECTION, SUSPENSION INTRA-ARTICULAR; INTRALESIONAL; INTRAMUSCULAR; SOFT TISSUE ONCE
Status: COMPLETED | OUTPATIENT
Start: 2025-02-28 | End: 2025-02-28

## 2025-02-28 RX ADMIN — METHYLPREDNISOLONE ACETATE 40 MG: 40 INJECTION, SUSPENSION INTRA-ARTICULAR; INTRALESIONAL; INTRAMUSCULAR; SOFT TISSUE at 09:35

## 2025-02-28 NOTE — PROGRESS NOTES
Name: Radha Whitaker  YOB: 1955  Gender: female  MRN: [id number]        Current Outpatient Medications:     acetaminophen (TYLENOL) 500 MG tablet, Take 500 mg by mouth every 6 hours as needed, Disp: , Rfl:     aspirin 81 MG EC tablet, Take 81 mg by mouth every 12 hours, Disp: , Rfl:     celecoxib (CELEBREX) 100 MG capsule, Take 100 mg by mouth 2 times daily, Disp: , Rfl:     cyclobenzaprine (FLEXERIL) 5 MG tablet, Take 5 mg by mouth 3 times daily as needed, Disp: , Rfl:     docusate (COLACE, DULCOLAX) 100 MG CAPS, Take 100 mg by mouth 3 times daily as needed, Disp: , Rfl:     Insulin Degludec (TRESIBA FLEXTOUCH) 200 UNIT/ML SOPN, Inject 36 Units into the skin, Disp: , Rfl:     insulin detemir (LEVEMIR) 100 UNIT/ML injection vial, Inject 36 Units into the skin daily, Disp: , Rfl:     lisinopril (PRINIVIL;ZESTRIL) 10 MG tablet, Take 10 mg by mouth daily, Disp: , Rfl:     metFORMIN (GLUCOPHAGE) 1000 MG tablet, TAKE 1 TABLET BY MOUTH ONCE DAILY WITH MEALS, Disp: , Rfl:     metFORMIN (GLUCOPHAGE) 500 MG tablet, Take 1,000 mg by mouth daily, Disp: , Rfl:     oxyCODONE HCl (OXY-IR) 10 MG immediate release tablet, Take 10 mg by mouth every 4 hours as needed., Disp: , Rfl:   Allergies   Allergen Reactions    Ampicillin Diarrhea       CC: RIGHTknee pain    Impression: RIGHT Knee DJD    The patient's name and date of birth were confirmed prior to the injection.  The injection site was also confirmed with the patient prior to the procedure.    Procedure: Depomedrol(US) injection with US guidance    Radiology Report: Ultrasound guidance was used to examine the joint, ensure adequate needle placement and to decrease the risk of joint aggravation.  The intercondylar notch, retropatellar fat pad, patella tendon, patella and tibia were all visualized.  Pre and post injection ultrasound still images were obtained and placed in the record. Image were obtained with a [facility] ultrasound transducer (model

## 2025-03-05 ENCOUNTER — OFFICE VISIT (OUTPATIENT)
Age: 70
End: 2025-03-05
Payer: MEDICARE

## 2025-03-05 VITALS — HEIGHT: 68 IN | BODY MASS INDEX: 41 KG/M2 | WEIGHT: 270.5 LBS

## 2025-03-05 DIAGNOSIS — G89.29 CHRONIC PAIN OF LEFT KNEE: ICD-10-CM

## 2025-03-05 DIAGNOSIS — M47.816 LUMBAR FACET ARTHROPATHY: ICD-10-CM

## 2025-03-05 DIAGNOSIS — M51.362 DEGENERATION OF INTERVERTEBRAL DISC OF LUMBAR REGION WITH DISCOGENIC BACK PAIN AND LOWER EXTREMITY PAIN: ICD-10-CM

## 2025-03-05 DIAGNOSIS — M54.50 LOW BACK PAIN, UNSPECIFIED BACK PAIN LATERALITY, UNSPECIFIED CHRONICITY, UNSPECIFIED WHETHER SCIATICA PRESENT: Primary | ICD-10-CM

## 2025-03-05 DIAGNOSIS — M54.16 LUMBAR RADICULOPATHY: ICD-10-CM

## 2025-03-05 DIAGNOSIS — M25.562 CHRONIC PAIN OF LEFT KNEE: ICD-10-CM

## 2025-03-05 PROCEDURE — 4004F PT TOBACCO SCREEN RCVD TLK: CPT | Performed by: NURSE PRACTITIONER

## 2025-03-05 PROCEDURE — 1090F PRES/ABSN URINE INCON ASSESS: CPT | Performed by: NURSE PRACTITIONER

## 2025-03-05 PROCEDURE — 99204 OFFICE O/P NEW MOD 45 MIN: CPT | Performed by: NURSE PRACTITIONER

## 2025-03-05 PROCEDURE — G8428 CUR MEDS NOT DOCUMENT: HCPCS | Performed by: NURSE PRACTITIONER

## 2025-03-05 PROCEDURE — 1123F ACP DISCUSS/DSCN MKR DOCD: CPT | Performed by: NURSE PRACTITIONER

## 2025-03-05 PROCEDURE — G2211 COMPLEX E/M VISIT ADD ON: HCPCS | Performed by: NURSE PRACTITIONER

## 2025-03-05 PROCEDURE — 3017F COLORECTAL CA SCREEN DOC REV: CPT | Performed by: NURSE PRACTITIONER

## 2025-03-05 PROCEDURE — G8417 CALC BMI ABV UP PARAM F/U: HCPCS | Performed by: NURSE PRACTITIONER

## 2025-03-05 PROCEDURE — G8400 PT W/DXA NO RESULTS DOC: HCPCS | Performed by: NURSE PRACTITIONER

## 2025-03-05 RX ORDER — NYSTATIN 100000 [USP'U]/G
POWDER TOPICAL
COMMUNITY
Start: 2025-02-01

## 2025-03-05 RX ORDER — GABAPENTIN 100 MG/1
100-300 CAPSULE ORAL NIGHTLY
Qty: 90 CAPSULE | Refills: 0 | Status: SHIPPED | OUTPATIENT
Start: 2025-03-05 | End: 2025-04-04

## 2025-03-05 NOTE — PROGRESS NOTES
the facet joints.  Severe disc degeneration at L5-S1 with large anterior osteophyte bone spurring to fusing the disc space.  Moderate to advanced disc degeneration at L4-5.  Moderate at L3-4.  There is a slight rotational component of her spine in the lower lumbar.  There is some minimal degenerative right hip changes.  Left total hip arthroplasty is noted.    Interpretation: Lower lumbar degenerative disc disease greatest at L5-S1        Assessment/Plan:       Diagnosis Orders   1. Low back pain, unspecified back pain laterality, unspecified chronicity, unspecified whether sciatica present  XR LUMBAR SPINE (2-3 VIEWS)      2. Lumbar radiculopathy        3. Degeneration of intervertebral disc of lumbar region with discogenic back pain and lower extremity pain        4. Lumbar facet arthropathy        5. Chronic pain of left knee            This patient's clinical history and physical exam is consistent with a bilateral   L-5 lumbar radiculopathy.  Patient has severe disc degeneration at L5-S1.  We discussed that this could be contributing to her chronic left knee pain.  Will go ahead and obtain an MRI of the lumbar spine.  I will see her back after the MRI.  Will trial low-dose gabapentin at night for symptomatic relief.    We discussed the natural history of lumbar radiculopathy in that many of these patients have near complete resolution of their symptoms within eight to twelve weeks with conservative care. We discussed that conservative treatments typically start with activity modification, and medication followed by physical therapy as symptoms allow.  Oral and/or epidural steroids are other options. I also discussed potential surgical options if the symptoms fail to improve or there is a progressive neurologic deficit and conservative management has been exhausted.  We discussed that surgery is not typically a reliable treatment for isolated back pain, but is usually very reliable in relieving buttock and leg

## 2025-03-19 ENCOUNTER — TELEPHONE (OUTPATIENT)
Dept: ORTHOPEDIC SURGERY | Age: 70
End: 2025-03-19

## 2025-03-19 NOTE — TELEPHONE ENCOUNTER
Left vm. Noticed patient has been receiving care at a different orthopedic office for the operative knee and other orthopedic concerns so she will need to get a handicap placard from them. Also, we have not seen her in several years.

## 2025-03-19 NOTE — TELEPHONE ENCOUNTER
Her handicap sticker has , can you renew this for her? She will pick the paperwork up if you will call when done. She prefers [facility].

## 2025-03-19 NOTE — TELEPHONE ENCOUNTER
Left detailed message on patients VM that she would need to get handicap placard from [employer] or she should come back in for an appointment with us for eval. We cannot give her a new one since it has been a few years since we have seen her.

## 2025-03-26 ENCOUNTER — OFFICE VISIT (OUTPATIENT)
Age: 70
End: 2025-03-26
Payer: MEDICARE

## 2025-03-26 DIAGNOSIS — M51.362 DEGENERATION OF INTERVERTEBRAL DISC OF LUMBAR REGION WITH DISCOGENIC BACK PAIN AND LOWER EXTREMITY PAIN: ICD-10-CM

## 2025-03-26 DIAGNOSIS — M47.816 LUMBAR FACET ARTHROPATHY: Primary | ICD-10-CM

## 2025-03-26 PROCEDURE — 4004F PT TOBACCO SCREEN RCVD TLK: CPT | Performed by: NURSE PRACTITIONER

## 2025-03-26 PROCEDURE — G8400 PT W/DXA NO RESULTS DOC: HCPCS | Performed by: NURSE PRACTITIONER

## 2025-03-26 PROCEDURE — 1090F PRES/ABSN URINE INCON ASSESS: CPT | Performed by: NURSE PRACTITIONER

## 2025-03-26 PROCEDURE — 1123F ACP DISCUSS/DSCN MKR DOCD: CPT | Performed by: NURSE PRACTITIONER

## 2025-03-26 PROCEDURE — 99214 OFFICE O/P EST MOD 30 MIN: CPT | Performed by: NURSE PRACTITIONER

## 2025-03-26 PROCEDURE — G8428 CUR MEDS NOT DOCUMENT: HCPCS | Performed by: NURSE PRACTITIONER

## 2025-03-26 PROCEDURE — 3017F COLORECTAL CA SCREEN DOC REV: CPT | Performed by: NURSE PRACTITIONER

## 2025-03-26 PROCEDURE — G8417 CALC BMI ABV UP PARAM F/U: HCPCS | Performed by: NURSE PRACTITIONER

## 2025-03-26 NOTE — PROGRESS NOTES
Patient will be referred for bilateral L4-S1 facet injections with the potential for RFA's in the future.  I will refer her to Dr. Cordon after the injections for further evaluation and treatment.    - Injection: The patient will be referred for a lumbar steroid injection to help reduce the symptoms. The patient understands the risks including hyperglycemia, immunosuppression, meningitis, cerebrospinal fluid leak, epidural hematoma, and reaction to medication. The patient may benefit from additional injections depending on the response. The injection should be a bilateral L4-S1 facet injections  -Referral to [facility] non-surgical spine provider for evaluation and treatment.      No orders of the defined types were placed in this encounter.       Orders Placed This Encounter   Procedures    Injection Authorization - Spine    [facility] (Spine Surgery)        4 This is a chronic illness/condition with exacerbation and progression      Return for Refer to FARHAN ASHRAF for eval and treat.     ALEX Begum - CNP  03/26/25      Elements of this note were created using speech recognition software.  As such, errors of speech recognition may be present.

## 2025-04-04 DIAGNOSIS — M54.50 LOW BACK PAIN, UNSPECIFIED BACK PAIN LATERALITY, UNSPECIFIED CHRONICITY, UNSPECIFIED WHETHER SCIATICA PRESENT: ICD-10-CM

## 2025-04-04 DIAGNOSIS — M54.16 LUMBAR RADICULOPATHY: ICD-10-CM

## 2025-04-04 DIAGNOSIS — M47.816 LUMBAR FACET ARTHROPATHY: ICD-10-CM

## 2025-04-04 DIAGNOSIS — M51.362 DEGENERATION OF INTERVERTEBRAL DISC OF LUMBAR REGION WITH DISCOGENIC BACK PAIN AND LOWER EXTREMITY PAIN: ICD-10-CM

## 2025-04-04 DIAGNOSIS — G89.29 CHRONIC PAIN OF LEFT KNEE: ICD-10-CM

## 2025-04-04 DIAGNOSIS — M25.562 CHRONIC PAIN OF LEFT KNEE: ICD-10-CM

## 2025-04-07 ENCOUNTER — OFFICE VISIT (OUTPATIENT)
Dept: ORTHOPEDIC SURGERY | Age: 70
End: 2025-04-07
Payer: MEDICARE

## 2025-04-07 DIAGNOSIS — M47.816 LUMBAR FACET ARTHROPATHY: Primary | ICD-10-CM

## 2025-04-07 PROCEDURE — 64494 INJ PARAVERT F JNT L/S 2 LEV: CPT | Performed by: PHYSICAL MEDICINE & REHABILITATION

## 2025-04-07 PROCEDURE — 64493 INJ PARAVERT F JNT L/S 1 LEV: CPT | Performed by: PHYSICAL MEDICINE & REHABILITATION

## 2025-04-07 RX ORDER — TRIAMCINOLONE ACETONIDE 40 MG/ML
40 INJECTION, SUSPENSION INTRA-ARTICULAR; INTRAMUSCULAR ONCE
Status: COMPLETED | OUTPATIENT
Start: 2025-04-07 | End: 2025-04-07

## 2025-04-07 RX ADMIN — TRIAMCINOLONE ACETONIDE 40 MG: 40 INJECTION, SUSPENSION INTRA-ARTICULAR; INTRAMUSCULAR at 10:12

## 2025-04-07 NOTE — PROGRESS NOTES
Name: Radha Whitaker  YOB: 1955  Gender: female  MRN: [id number]    Procedure: Bilateral  L4-L5 and L5-S1 facet joint injections     Precautions: Radha Whitaker deniesprior sensitivity to steroid, local anesthetic, iodine, or shellfish.     The procedure was discussed at length with the patient and informed consent was signed and placed in the chart. The patient was placed in a prone position on the fluoroscopy table and the skin was prepped and draped in a routine sterile fashion. The areas to be injected were each anesthetized with 1% lidocaine.    Next, a 25-gauge 3.5 inch spinal needle was carefully advanced under fluoroscopic guidance to the rightL5 - S1 facet joint. Aspiration was negative. Once proper placement was confirmed, 1 ml of 0.25% Marcaine and  0.5 mL 40 mg/mL kenalog were injected through the spinal needle.     The above procedure was then repeated through the rightL4 - L5, and left L5 - S1, and leftL4 - L5 facet joints.      Fluoroscopic guidance was used intermittently over a 10-minute period to insure proper needle placement and her safety. A hard copy of the fluoroscopic images has been placed in her chart and is saved on the C-arm hard drive. She was monitored for 30 minutes after the procedure and discharged home in a stable fashion with a routine follow up.     Procedural Diagnosis:     ICD-10-CM    1. Lumbar facet arthropathy  M47.816 FL INJ LUMB/SAC FACET SINGLE LEVEL     XR INJ FACET LUMB SACRAL 2ND LVL     triamcinolone acetonide (KENALOG-40) injection 40 mg           MICHEL KENDRICK MD  04/07/25

## 2025-04-24 NOTE — PROGRESS NOTES
[facility]  Consultation Note  Virtual/Telephone Visit     Patient ID:  Name: Radha Whitaker  MRN: [id number]  AGE: 70 y.o.  : 1955    Date of Consultation:  2025    CC:   Chief Complaint   Patient presents with    Back Problem         HPI:  Ms. Whitaker is a 70-year-old female who presents today for evaluation of low back pain.  She saw our surgical spine team, did not recommend surgical intervention for her pain at this time.  She underwent bilateral L4-5-S1 facet joint injections 2025.  These have helped with her pain.  She feels that she has had a significant reduction in her usual low back pain with these injections.    However, she continues to have pain in the left knee.  She has a history of left knee replacement and left hip replacement.  She reports that the left hip replacement was done with the hopes that it may also improve her knee pain.  However, it has not improved.  She has seen a couple of orthopedic surgeons for that, and they did not feel there was a complication from her left total knee replacement hardware to account for the persistence of her symptoms.  The pain in the knee continues to be quite debilitating.  It affects her function and limits her ability to walk or stand for long.    MRI lumbar spine was ordered by another provider and performed 2025.  I did review these films.  On my review of the films, she has multilevel degenerative disc changes, spondylosis, facet arthropathy of the lumbar spine.  She has early central stenosis L3-4-5.  She has bilateral lateral recess narrowing at L3-4 and neuroforaminal narrowing bilaterally at L4-5.     History of Present Illness        Past Medical History Includes:   Past Medical History:   Diagnosis Date    Hypertension     JAGJIT (obstructive sleep apnea)     CPAP QHS    Osteoarthritis     Type 2 diabetes mellitus (HCC)     controlled with oral medication and insulin, does not check BS

## 2025-04-25 ENCOUNTER — TELEMEDICINE (OUTPATIENT)
Dept: ORTHOPEDIC SURGERY | Age: 70
End: 2025-04-25

## 2025-04-25 DIAGNOSIS — M51.360 DEGENERATION OF INTERVERTEBRAL DISC OF LUMBAR REGION WITH DISCOGENIC BACK PAIN: ICD-10-CM

## 2025-04-25 DIAGNOSIS — M47.816 LUMBAR SPONDYLOSIS: Primary | ICD-10-CM

## 2025-07-18 ENCOUNTER — TELEPHONE (OUTPATIENT)
Dept: ORTHOPEDIC SURGERY | Age: 70
End: 2025-07-18

## 2025-07-18 DIAGNOSIS — M54.16 LUMBAR RADICULOPATHY: ICD-10-CM

## 2025-07-18 DIAGNOSIS — M47.816 LUMBAR FACET ARTHROPATHY: ICD-10-CM

## 2025-07-18 DIAGNOSIS — M51.360 DEGENERATION OF INTERVERTEBRAL DISC OF LUMBAR REGION WITH DISCOGENIC BACK PAIN: ICD-10-CM

## 2025-07-18 DIAGNOSIS — M47.816 LUMBAR SPONDYLOSIS: Primary | ICD-10-CM

## 2025-07-18 NOTE — TELEPHONE ENCOUNTER
Call returned to patient and she would like to repeat the spine injection. The last injection did provide relief. Appointment has been scheduled.

## 2025-07-18 NOTE — TELEPHONE ENCOUNTER
----- Message from Marietta BANDA sent at 7/18/2025  8:56 AM EDT -----  Regarding: SEGUNDO  Specialty Message to Provider  Specialty Message to Provider    Relationship to Patient: Self     Patient Message: pt  states  she has been waiting  since April to get a spinal injection with  [facility]   and that she knew it would take some time to get authorized... and never heard back from anyone    --------------------------------------------------------------------------------------------------------------------------    Call Back Information: OK to leave message on voicemail  Preferred Call Back Number: Phone  [phone]

## 2025-07-24 ENCOUNTER — OFFICE VISIT (OUTPATIENT)
Dept: ORTHOPEDIC SURGERY | Age: 70
End: 2025-07-24

## 2025-07-24 DIAGNOSIS — M51.360 DEGENERATION OF INTERVERTEBRAL DISC OF LUMBAR REGION WITH DISCOGENIC BACK PAIN: ICD-10-CM

## 2025-07-24 DIAGNOSIS — M47.816 LUMBAR FACET ARTHROPATHY: ICD-10-CM

## 2025-07-24 DIAGNOSIS — M47.816 LUMBAR SPONDYLOSIS: Primary | ICD-10-CM

## 2025-07-24 RX ORDER — TRIAMCINOLONE ACETONIDE 40 MG/ML
40 INJECTION, SUSPENSION INTRA-ARTICULAR; INTRAMUSCULAR ONCE
Status: COMPLETED | OUTPATIENT
Start: 2025-07-24 | End: 2025-07-24

## 2025-07-24 RX ADMIN — TRIAMCINOLONE ACETONIDE 40 MG: 40 INJECTION, SUSPENSION INTRA-ARTICULAR; INTRAMUSCULAR at 14:42

## 2025-07-24 NOTE — PROGRESS NOTES
Name: Radha Whitaker  YOB: 1955  Gender: female  MRN: [id number]    Procedure: Bilateral  L4-L5 and L5-S1 facet joint injections     Precautions: Radha Whitaker deniesprior sensitivity to steroid, local anesthetic, iodine, or shellfish.     The procedure was discussed at length with the patient and informed consent was signed and placed in the chart. The patient was placed in a prone position on the fluoroscopy table and the skin was prepped and draped in a routine sterile fashion. The areas to be injected were each anesthetized with 1% lidocaine.    Next, a 25-gauge 3.5 inch spinal needle was carefully advanced under fluoroscopic guidance to the rightL5 - S1 facet joint. Aspiration was negative. Once proper placement was confirmed, 1 ml of 0.25% Marcaine and  0.5 mL 40 mg/mL kenalog were injected through the spinal needle.     The above procedure was then repeated through the rightL4 - L5, and left L5 - S1, and leftL4 - L5 facet joints.      Fluoroscopic guidance was used intermittently over a 10-minute period to insure proper needle placement and her safety. A hard copy of the fluoroscopic images has been placed in her chart and is saved on the C-arm hard drive. She was monitored after the procedure and discharged home in a stable fashion with a routine follow up.     Procedural Diagnosis:     ICD-10-CM    1. Lumbar spondylosis  M47.816 FL INJ LUMB/SAC FACET SINGLE LEVEL     XR INJ FACET LUMB SACRAL 2ND LVL     triamcinolone acetonide (KENALOG-40) injection 40 mg      2. Degeneration of intervertebral disc of lumbar region with discogenic back pain  M51.360 FL INJ LUMB/SAC FACET SINGLE LEVEL     XR INJ FACET LUMB SACRAL 2ND LVL     triamcinolone acetonide (KENALOG-40) injection 40 mg      3. Lumbar facet arthropathy  M47.816 FL INJ LUMB/SAC FACET SINGLE LEVEL     XR INJ FACET LUMB SACRAL 2ND LVL     triamcinolone acetonide (KENALOG-40) injection 40 mg           MICHEL KENDRICK,

## 2025-07-29 ENCOUNTER — TELEPHONE (OUTPATIENT)
Dept: ORTHOPEDIC SURGERY | Age: 70
End: 2025-07-29

## 2025-07-29 NOTE — TELEPHONE ENCOUNTER
JOSE EM offering patient a knee eval with Dr. Gill. She was advised to return our call to schedule.

## 2025-08-05 ENCOUNTER — OFFICE VISIT (OUTPATIENT)
Dept: ORTHOPEDIC SURGERY | Age: 70
End: 2025-08-05
Payer: MEDICARE

## 2025-08-05 DIAGNOSIS — M16.11 ARTHRITIS OF RIGHT HIP: Primary | ICD-10-CM

## 2025-08-05 DIAGNOSIS — M25.561 RIGHT KNEE PAIN, UNSPECIFIED CHRONICITY: ICD-10-CM

## 2025-08-05 DIAGNOSIS — M17.11 PRIMARY OSTEOARTHRITIS OF RIGHT KNEE: ICD-10-CM

## 2025-08-05 DIAGNOSIS — M25.551 RIGHT HIP PAIN: ICD-10-CM

## 2025-08-05 PROCEDURE — 1123F ACP DISCUSS/DSCN MKR DOCD: CPT | Performed by: STUDENT IN AN ORGANIZED HEALTH CARE EDUCATION/TRAINING PROGRAM

## 2025-08-05 PROCEDURE — G8428 CUR MEDS NOT DOCUMENT: HCPCS | Performed by: STUDENT IN AN ORGANIZED HEALTH CARE EDUCATION/TRAINING PROGRAM

## 2025-08-05 PROCEDURE — 4004F PT TOBACCO SCREEN RCVD TLK: CPT | Performed by: STUDENT IN AN ORGANIZED HEALTH CARE EDUCATION/TRAINING PROGRAM

## 2025-08-05 PROCEDURE — G8417 CALC BMI ABV UP PARAM F/U: HCPCS | Performed by: STUDENT IN AN ORGANIZED HEALTH CARE EDUCATION/TRAINING PROGRAM

## 2025-08-05 PROCEDURE — 3017F COLORECTAL CA SCREEN DOC REV: CPT | Performed by: STUDENT IN AN ORGANIZED HEALTH CARE EDUCATION/TRAINING PROGRAM

## 2025-08-05 PROCEDURE — 1090F PRES/ABSN URINE INCON ASSESS: CPT | Performed by: STUDENT IN AN ORGANIZED HEALTH CARE EDUCATION/TRAINING PROGRAM

## 2025-08-05 PROCEDURE — 99204 OFFICE O/P NEW MOD 45 MIN: CPT | Performed by: STUDENT IN AN ORGANIZED HEALTH CARE EDUCATION/TRAINING PROGRAM

## 2025-08-05 PROCEDURE — G8400 PT W/DXA NO RESULTS DOC: HCPCS | Performed by: STUDENT IN AN ORGANIZED HEALTH CARE EDUCATION/TRAINING PROGRAM

## 2025-08-05 PROCEDURE — 20611 DRAIN/INJ JOINT/BURSA W/US: CPT | Performed by: STUDENT IN AN ORGANIZED HEALTH CARE EDUCATION/TRAINING PROGRAM

## 2025-08-05 RX ORDER — METHYLPREDNISOLONE ACETATE 80 MG/ML
80 INJECTION, SUSPENSION INTRA-ARTICULAR; INTRALESIONAL; INTRAMUSCULAR; SOFT TISSUE ONCE
Status: COMPLETED | OUTPATIENT
Start: 2025-08-05 | End: 2025-08-05

## 2025-08-05 RX ADMIN — METHYLPREDNISOLONE ACETATE 80 MG: 80 INJECTION, SUSPENSION INTRA-ARTICULAR; INTRALESIONAL; INTRAMUSCULAR; SOFT TISSUE at 11:57

## 2025-08-25 ENCOUNTER — OFFICE VISIT (OUTPATIENT)
Dept: ORTHOPEDIC SURGERY | Age: 70
End: 2025-08-25
Payer: MEDICARE

## 2025-08-25 DIAGNOSIS — M17.11 PRIMARY OSTEOARTHRITIS OF RIGHT KNEE: ICD-10-CM

## 2025-08-25 DIAGNOSIS — M16.11 ARTHRITIS OF RIGHT HIP: Primary | ICD-10-CM

## 2025-08-25 PROCEDURE — 20611 DRAIN/INJ JOINT/BURSA W/US: CPT | Performed by: STUDENT IN AN ORGANIZED HEALTH CARE EDUCATION/TRAINING PROGRAM

## 2025-08-25 RX ORDER — METHYLPREDNISOLONE ACETATE 80 MG/ML
80 INJECTION, SUSPENSION INTRA-ARTICULAR; INTRALESIONAL; INTRAMUSCULAR; SOFT TISSUE ONCE
Status: COMPLETED | OUTPATIENT
Start: 2025-08-25 | End: 2025-08-25

## 2025-08-25 RX ADMIN — METHYLPREDNISOLONE ACETATE 80 MG: 80 INJECTION, SUSPENSION INTRA-ARTICULAR; INTRALESIONAL; INTRAMUSCULAR; SOFT TISSUE at 08:32

## (undated) DEVICE — TOTAL KNEE DR KAVOLUS: Brand: MEDLINE INDUSTRIES, INC.

## (undated) DEVICE — HOOD: Brand: FLYTE

## (undated) DEVICE — DRAPE SHT 3 QTR PROXIMA 53X77 --

## (undated) DEVICE — SUTURE MCRYL SZ 2-0 L27IN ABSRB UD CP-1 1 L36MM 1/2 CIR REV Y266H

## (undated) DEVICE — REM POLYHESIVE ADULT PATIENT RETURN ELECTRODE: Brand: VALLEYLAB

## (undated) DEVICE — SUTURE PDS II SZ 1 L96IN ABSRB VLT TP-1 L65MM 1/2 CIR Z880G

## (undated) DEVICE — SYR 10ML CTRL LR LCK NSAF LF --

## (undated) DEVICE — BUTTON SWITCH PENCIL BLADE ELECTRODE, HOLSTER: Brand: EDGE

## (undated) DEVICE — SOLUTION IV 1000ML 0.9% SOD CHL

## (undated) DEVICE — SOLUTION IRRIG 3000ML 0.9% SOD CHL FLX CONT 0797208] ICU MEDICAL INC]

## (undated) DEVICE — PIN BNE FIX 4X140MM STRL -- 1EA=2PK MAKO

## (undated) DEVICE — SPONGE LAP 18X18IN STRL -- 5/PK

## (undated) DEVICE — Z DISCONTINUED PER MEDLINE USE 2741944 DRESSING AQUACEL 12 IN SURG W9XL30CM SIL CVR WTRPRF VIR BACT BARR ANTIMIC

## (undated) DEVICE — SYR LR LCK 1ML GRAD NSAF 30ML --

## (undated) DEVICE — NEEDLE HYPO 21GA L1.5IN GRN POLYPR HUB S STL REG BVL STR

## (undated) DEVICE — SUTURE VCRL SZ 1 L27IN ABSRB UD L36MM CP-1 1/2 CIR REV CUT J268H

## (undated) DEVICE — BIPOLAR SEALER 23-112-1 AQM 6.0: Brand: AQUAMANTYS ®

## (undated) DEVICE — PIN BNE 4X110MM STRL -- 2/PK MAKO

## (undated) DEVICE — CURETTE BNE CEM 10IN DISP --

## (undated) DEVICE — CORD RETRCT SIL

## (undated) DEVICE — SYR 50ML LR LCK 1ML GRAD NSAF --

## (undated) DEVICE — KIT DRP FOR RIO ROBOTIC ARM ASST SYS

## (undated) DEVICE — KIT INT FIX FEM TIB CKPT MAKOPLASTY

## (undated) DEVICE — SOLUTION IV 250ML 0.9% SOD CHL CLR INJ FLX BG CONT PRT CLSR

## (undated) DEVICE — 3M™ STERI-DRAPE™ INSTRUMENT POUCH 1018: Brand: STERI-DRAPE™

## (undated) DEVICE — TRAY PREP DRY W/ PREM GLV 2 APPL 6 SPNG 2 UNDPD 1 OVERWRAP

## (undated) DEVICE — STOCKINETTE IMPERV 12X48IN STE -- MEDICHOICE

## (undated) DEVICE — NEEDLE HYPO 18GA L1.5IN PNK S STL HUB POLYPR SHLD REG BVL

## (undated) DEVICE — STERILE PRESSURE PROTECTOR PAD® FOR DE MAYO UNIVERSAL DISTRACTOR® (10/CASE): Brand: DE MAYO UNIVERSAL DISTRACTOR®

## (undated) DEVICE — Device

## (undated) DEVICE — KIT PROC KNE TRACKING PK/1 -- VIZADISC MAKO

## (undated) DEVICE — HANDPIECE SET WITH COAXIAL HIGH FLOW TIP AND SUCTION TUBE: Brand: INTERPULSE

## (undated) DEVICE — DUAL CUT SAGITTAL BLADE

## (undated) DEVICE — BLADE SURG SAW STD S STL OSC W/ SERR EDGE DISP

## (undated) DEVICE — PAD,NON-ADHERENT,3X8,STERILE,LF,1/PK: Brand: MEDLINE

## (undated) DEVICE — UTILITY MARKER,BLACK WITH LABELS: Brand: DEVON

## (undated) DEVICE — PREP CHLORAPREP 10.5 ML ORG --